# Patient Record
Sex: FEMALE | Race: ASIAN | Employment: OTHER | URBAN - METROPOLITAN AREA
[De-identification: names, ages, dates, MRNs, and addresses within clinical notes are randomized per-mention and may not be internally consistent; named-entity substitution may affect disease eponyms.]

---

## 2017-01-06 ENCOUNTER — CLINICAL SUPPORT (OUTPATIENT)
Dept: FAMILY MEDICINE CLINIC | Age: 37
End: 2017-01-06

## 2017-01-06 DIAGNOSIS — Z23 ENCOUNTER FOR IMMUNIZATION: Primary | ICD-10-CM

## 2017-01-10 ENCOUNTER — TELEPHONE (OUTPATIENT)
Dept: FAMILY MEDICINE CLINIC | Age: 37
End: 2017-01-10

## 2017-01-10 NOTE — TELEPHONE ENCOUNTER
Per fax from [employer] medication seroquel require clarification.  Fax states require prior auth for the qty

## 2017-01-24 ENCOUNTER — TELEPHONE (OUTPATIENT)
Dept: FAMILY MEDICINE CLINIC | Age: 37
End: 2017-01-24

## 2017-01-24 NOTE — TELEPHONE ENCOUNTER
Pt requesting referral to Reproductive Endocrinology and referral for breast reduction. Pt also stopped by office to drop off medical records from previous provider.

## 2017-01-24 NOTE — TELEPHONE ENCOUNTER
Spoke with patient and informed her per dr. Joseph Qiu she needs for you to come in for an appt. appt is scheduled.

## 2017-01-24 NOTE — TELEPHONE ENCOUNTER
Spoke with pt and asked pt where would she like to go for these referrals, asked pt to confirm insurance and pt stated \"aint it already in the system\", attempt to explain to pt that she will need an appt for the referrals, and to be further evaluated. Attempt made to let pt know that she has only seen Dr. Dana Cali would require an appt due to this being a new issue. Pt stated why dont Dr. Dana Cali read the notes that i dropped off. Attempt made again to let pt know that this would require an appt. Called placed on hold and nurse spoke with psr. This would require an appt. Will send to provider for final review.

## 2017-01-24 NOTE — TELEPHONE ENCOUNTER
Pt states spoke with nurse regarding referral. States was told by nurse that she need to make an appt to discuss referral with Dr Gena Sapp. Pt states does not understand why she has to make an appt. States she dropped off records for Dr Gena Sapp to review regarding referrals. [facility] told pt to contact pcp for referrals due to her lap band and hormone levels. Per nurse [facility], Referrals are very specific and will need to discuss with Dr Gena Sapp. Per nurse Sanchez Pt has been to office one time.   Pt states please have Dr Gena Sapp or referral coordinator call regarding referral.

## 2017-01-25 ENCOUNTER — OFFICE VISIT (OUTPATIENT)
Dept: FAMILY MEDICINE CLINIC | Age: 37
End: 2017-01-25

## 2017-01-25 DIAGNOSIS — Z31.69 INFERTILITY COUNSELING: Primary | ICD-10-CM

## 2017-01-25 DIAGNOSIS — E28.2 POLYCYSTIC OVARIES: ICD-10-CM

## 2017-01-29 NOTE — PROGRESS NOTES
HISTORY OF PRESENT ILLNESS  Natalee Fleix is a 31 y.o. female. HPI Comments: Patient is here today as she is due to see an ob/gyn for work up for an abnormal PAP smear. She would also like a referral to an endocrinologist/infertility specialist which I will make today. She has no other issues. Referral Follow Up   The history is provided by the patient. This is a new problem. The problem occurs constantly. The problem has been gradually worsening. Pertinent negatives include no chest pain, no abdominal pain, no headaches and no shortness of breath. Nothing aggravates the symptoms. Nothing relieves the symptoms. She has tried nothing for the symptoms. Review of Systems   Constitutional: Negative for chills, fever and malaise/fatigue. HENT: Negative for congestion, ear discharge, ear pain, hearing loss and sore throat. Eyes: Negative for blurred vision, double vision, pain and discharge. Respiratory: Negative for cough, sputum production, shortness of breath and wheezing. Cardiovascular: Negative for chest pain, palpitations, claudication and leg swelling. Gastrointestinal: Negative for abdominal pain, constipation, diarrhea, nausea and vomiting. Genitourinary: Negative for dysuria, frequency and hematuria. Musculoskeletal: Negative for back pain, joint pain and myalgias. Neurological: Negative for dizziness, tingling, focal weakness, weakness and headaches. Psychiatric/Behavioral: Negative for depression and suicidal ideas. The patient is not nervous/anxious. There were no vitals taken for this visit. Physical Exam   Constitutional: She is oriented to person, place, and time. She appears well-developed and well-nourished. No distress. HENT:   Head: Normocephalic and atraumatic. Right Ear: External ear normal.   Left Ear: External ear normal.   Mouth/Throat: Oropharynx is clear and moist.   Eyes: EOM are normal. Pupils are equal, round, and reactive to light.  No scleral icterus. Neck: Normal range of motion. No thyromegaly present. Cardiovascular: Normal rate, regular rhythm and normal heart sounds. Pulmonary/Chest: Effort normal and breath sounds normal. No respiratory distress. She has no wheezes. Abdominal: Soft. Bowel sounds are normal. She exhibits no distension. There is no tenderness. Lymphadenopathy:     She has no cervical adenopathy. Neurological: She is alert and oriented to person, place, and time. Psychiatric: She has a normal mood and affect.        ASSESSMENT and PLAN  Referral request :  - Spoke with patient and requested referral will be made

## 2017-01-30 ENCOUNTER — TELEPHONE (OUTPATIENT)
Dept: FAMILY MEDICINE CLINIC | Age: 37
End: 2017-01-30

## 2017-01-30 NOTE — TELEPHONE ENCOUNTER
Pt called twice & was placed on hold. The third time she requested to speak with the PM so I connected her to the PM's voice mail because she was not in the office yet. She called back a forth time from her work phone stating the call was being recorded. She asked for the \"Clinical Ethics Advisor\" I advised her that I was not sure what that was. She stated I could not be the only one answering the phones & I assured her I am at this time. She called back again from her cell phone and asked who doctor's De La Torre's nurse is. I informed her that her name is [facility], [address]. She then became extremely rude & calling me names so the call was disconnected.

## 2017-01-31 ENCOUNTER — TELEPHONE (OUTPATIENT)
Dept: FAMILY MEDICINE CLINIC | Age: 37
End: 2017-01-31

## 2017-01-31 NOTE — TELEPHONE ENCOUNTER
Pt requesting to speak with Elvis Steen regarding referrals. I placed pt on hold so I can finish checking in a patient. I returned to the call and advised pt papito was gone for the day and I asked pt was call regarding a referral. Pt states I placed her on hold like if I was going to get Elvis Steen and requested to speak with a nurse. I cintacted nurse Sanchez and call was transferred.

## 2017-01-31 NOTE — TELEPHONE ENCOUNTER
Pt called, pt approved Dr. Sharlene Vogt MD, Endocrinologist, [facility] refaxed information. This encounter will be closed.

## 2017-02-08 ENCOUNTER — TELEPHONE (OUTPATIENT)
Dept: FAMILY MEDICINE CLINIC | Age: 37
End: 2017-02-08

## 2017-02-08 NOTE — TELEPHONE ENCOUNTER
Patient is requesting a referral to a plastic surgeon for a breast reduction. Her OB/GYN has given her the name of Dr. Angel Elizalde [phone].   Could you please create the referral.

## 2017-02-09 NOTE — TELEPHONE ENCOUNTER
Per Endo states received referral request for pt. States unfortunately the providers do not see pt's for infertility counseling.

## 2017-02-10 NOTE — TELEPHONE ENCOUNTER
She mentioned at the last visit that she had a list of providers who will take her insurance for infertility work up.  pLEASE ASK HER INTO LOOKING INTO CALLING ONE OF THESE OFFICES

## 2017-03-16 ENCOUNTER — OFFICE VISIT (OUTPATIENT)
Dept: FAMILY MEDICINE CLINIC | Age: 37
End: 2017-03-16

## 2017-03-16 VITALS
BODY MASS INDEX: 37.93 KG/M2 | DIASTOLIC BLOOD PRESSURE: 82 MMHG | HEART RATE: 90 BPM | SYSTOLIC BLOOD PRESSURE: 114 MMHG | HEIGHT: 66 IN | RESPIRATION RATE: 18 BRPM | TEMPERATURE: 97.5 F | OXYGEN SATURATION: 97 % | WEIGHT: 236 LBS

## 2017-03-16 DIAGNOSIS — R87.610 ATYPICAL SQUAMOUS CELLS OF UNDETERMINED SIGNIFICANCE ON CYTOLOGIC SMEAR OF CERVIX (ASC-US): ICD-10-CM

## 2017-03-16 DIAGNOSIS — L02.92 BOIL: ICD-10-CM

## 2017-03-16 DIAGNOSIS — Z01.419 WELL WOMAN EXAM: ICD-10-CM

## 2017-03-16 DIAGNOSIS — Z01.419 WELL WOMAN EXAM: Primary | ICD-10-CM

## 2017-03-16 NOTE — PROGRESS NOTES
Chief Complaint   Patient presents with    Well Woman         1. Have you been to the ER, urgent care clinic since your last visit? Hospitalized since your last visit? Yes Where: [facility] for skin abscess 3/16/17    2. Have you seen or consulted any other health care providers outside of the [address] [place] [address] since your last visit? Include any pap smears or colon screening.  No

## 2017-03-16 NOTE — PROGRESS NOTES
HISTORY OF PRESENT ILLNESS  Ayush Nguyen is a 31 y.o. female. HPI Comments: Patient is here for a well woman exam.  Patient mentions she had a boil down there after she wore some pants that made is worse. Patient is due to have her PAP smear today. She did get a chance to see a fertility specialist. She mentions she will be seeing her ob/gyn in April. Well Woman   The history is provided by the patient. This is a new problem. The problem occurs constantly. The problem has not changed since onset. Pertinent negatives include no chest pain, no abdominal pain, no headaches and no shortness of breath. Nothing aggravates the symptoms. Nothing relieves the symptoms. She has tried nothing for the symptoms. Review of Systems   Constitutional: Negative for chills, fever and malaise/fatigue. HENT: Negative for congestion, ear pain, hearing loss, nosebleeds and sore throat. Eyes: Negative for blurred vision, double vision and discharge. Respiratory: Negative for cough, shortness of breath and wheezing. Cardiovascular: Negative for chest pain, palpitations and leg swelling. Gastrointestinal: Negative for abdominal pain, constipation and diarrhea. Musculoskeletal: Negative for myalgias and neck pain. Neurological: Negative for dizziness, focal weakness, weakness and headaches. Psychiatric/Behavioral: The patient is not nervous/anxious. Visit Vitals    /82    Pulse 90    Temp 97.5 °F (36.4 °C)    Resp 18    Ht 5' 6\" (1.676 m)    Wt 236 lb (107 kg)    SpO2 97%    BMI 38.09 kg/m2       Physical Exam   Constitutional: She is oriented to person, place, and time. She appears well-developed and well-nourished. No distress. HENT:   Head: Normocephalic and atraumatic. Right Ear: External ear normal.   Left Ear: External ear normal.   Mouth/Throat: Oropharynx is clear and moist.   Eyes: EOM are normal. Pupils are equal, round, and reactive to light. No scleral icterus.    Neck: Normal range of motion. No thyromegaly present. Cardiovascular: Normal rate, regular rhythm and normal heart sounds. Pulmonary/Chest: Effort normal and breath sounds normal. No respiratory distress. She has no wheezes. Abdominal: Soft. Bowel sounds are normal. She exhibits no distension. There is no tenderness. Genitourinary: Vagina normal and uterus normal. Rectal exam shows guaiac negative stool. No vaginal discharge found. Lymphadenopathy:     She has no cervical adenopathy. Neurological: She is alert and oriented to person, place, and time. Psychiatric: She has a normal mood and affect.        ASSESSMENT and PLAN  Well woman exam:  - Pap Smear done in office

## 2017-03-19 LAB
A VAGINAE DNA VAG QL NAA+PROBE: NORMAL SCORE
BVAB2 DNA VAG QL NAA+PROBE: NORMAL SCORE
C ALBICANS DNA VAG QL NAA+PROBE: NEGATIVE
C GLABRATA DNA VAG QL NAA+PROBE: NEGATIVE
C TRACH RRNA SPEC QL NAA+PROBE: NEGATIVE
MEGA1 DNA VAG QL NAA+PROBE: NORMAL SCORE
N GONORRHOEA RRNA SPEC QL NAA+PROBE: NEGATIVE
T VAGINALIS RRNA SPEC QL NAA+PROBE: NEGATIVE

## 2017-03-21 ENCOUNTER — TELEPHONE (OUTPATIENT)
Dept: FAMILY MEDICINE CLINIC | Age: 37
End: 2017-03-21

## 2017-03-21 LAB
CYTOLOGIST CVX/VAG CYTO: NORMAL
CYTOLOGY CVX/VAG DOC THIN PREP: NORMAL
DX ICD CODE: NORMAL
HPV I/H RISK 4 DNA CVX QL PROBE+SIG AMP: NEGATIVE
Lab: NORMAL
Lab: NORMAL
OTHER STN SPEC: NORMAL
PATH REPORT.FINAL DX SPEC: NORMAL
STAT OF ADQ CVX/VAG CYTO-IMP: NORMAL

## 2017-03-21 NOTE — TELEPHONE ENCOUNTER
Informed patient she needs to go back to the  ER due to her abscess, and f/u with us after. Patient verbally agreed.

## 2017-03-21 NOTE — TELEPHONE ENCOUNTER
Pt stated at the ER. Something about the system is down & wondering if someone here can do the procedure. Please call her ASAP.

## 2017-03-23 ENCOUNTER — TELEPHONE (OUTPATIENT)
Dept: FAMILY MEDICINE CLINIC | Age: 37
End: 2017-03-23

## 2017-03-23 RX ORDER — METRONIDAZOLE 500 MG/1
500 TABLET ORAL 2 TIMES DAILY
Qty: 14 TAB | Refills: 0 | Status: SHIPPED | OUTPATIENT
Start: 2017-03-23 | End: 2017-03-30

## 2017-03-26 ENCOUNTER — TELEPHONE (OUTPATIENT)
Dept: INTERNAL MEDICINE CLINIC | Age: 37
End: 2017-03-26

## 2017-03-27 ENCOUNTER — TELEPHONE (OUTPATIENT)
Dept: FAMILY MEDICINE CLINIC | Age: 37
End: 2017-03-27

## 2017-03-28 NOTE — TELEPHONE ENCOUNTER
Pt returning call again regarding pap. States called nurse cash back on mon and have not received a call back. Pt asked do she need to come to office.  Advised pt no will receive a call back

## 2017-04-04 NOTE — TELEPHONE ENCOUNTER
Pt states made an appt with GYN @ [facility] with Dr Sheryle Groom for tomorrow. States will p/u copy of pap results to take to appt.

## 2017-04-04 NOTE — TELEPHONE ENCOUNTER
Pt requesting referral to GYN in [place]. Pt inquiring if she need to follow up with GYN due to pap results or do she need to just see Dr Tash Mc. Contacted [facility], [address] and stated to have pt follow up with GYN. Advised pt of message.  Pt stated does not have a GYN

## 2017-04-12 ENCOUNTER — TELEPHONE (OUTPATIENT)
Dept: INTERNAL MEDICINE CLINIC | Age: 37
End: 2017-04-12

## 2017-04-12 RX ORDER — NAPROXEN 500 MG/1
TABLET ORAL
Qty: 20 TAB | Refills: 0 | Status: SHIPPED | OUTPATIENT
Start: 2017-04-12 | End: 2017-07-28 | Stop reason: SDUPTHER

## 2017-04-14 NOTE — TELEPHONE ENCOUNTER
Pt called on call requesting naprosyn refill from Dr. Cecilio Thakur due to a recent procedure - cool sculpting that she had done. I questioned why she wasn't asking the surgeon who performed the surgery to refill this, but it was in her med list, so I went ahead and refilled it for her.

## 2017-04-20 ENCOUNTER — OFFICE VISIT (OUTPATIENT)
Dept: FAMILY MEDICINE CLINIC | Age: 37
End: 2017-04-20

## 2017-04-20 VITALS
TEMPERATURE: 98.4 F | BODY MASS INDEX: 38.57 KG/M2 | SYSTOLIC BLOOD PRESSURE: 115 MMHG | RESPIRATION RATE: 20 BRPM | DIASTOLIC BLOOD PRESSURE: 80 MMHG | WEIGHT: 240 LBS | HEART RATE: 83 BPM | HEIGHT: 66 IN | OXYGEN SATURATION: 100 %

## 2017-04-20 DIAGNOSIS — J30.89 ENVIRONMENTAL AND SEASONAL ALLERGIES: Primary | ICD-10-CM

## 2017-04-20 DIAGNOSIS — J30.81 ALLERGIC RHINITIS DUE TO ANIMAL HAIR AND DANDER: ICD-10-CM

## 2017-04-20 RX ORDER — PRAZOSIN HYDROCHLORIDE 1 MG/1
CAPSULE ORAL
COMMUNITY
Start: 2017-02-14 | End: 2019-02-21

## 2017-04-20 RX ORDER — METFORMIN HYDROCHLORIDE 750 MG/1
TABLET, EXTENDED RELEASE ORAL
COMMUNITY
Start: 2017-03-09 | End: 2018-02-07

## 2017-04-20 RX ORDER — FLUTICASONE PROPIONATE 50 MCG
2 SPRAY, SUSPENSION (ML) NASAL
Qty: 1 BOTTLE | Refills: 1 | Status: SHIPPED | OUTPATIENT
Start: 2017-04-20 | End: 2018-04-06 | Stop reason: SDUPTHER

## 2017-04-20 RX ORDER — NEOMYCIN SULFATE, POLYMYXIN B SULFATE AND HYDROCORTISONE 10; 3.5; 1 MG/ML; MG/ML; [USP'U]/ML
3 SUSPENSION/ DROPS AURICULAR (OTIC) 4 TIMES DAILY
COMMUNITY
End: 2017-11-13 | Stop reason: SDUPTHER

## 2017-04-20 RX ORDER — ALBUTEROL SULFATE 90 UG/1
2 AEROSOL, METERED RESPIRATORY (INHALATION)
Qty: 1 INHALER | Refills: 0 | Status: SHIPPED | OUTPATIENT
Start: 2017-04-20 | End: 2018-06-25 | Stop reason: SDUPTHER

## 2017-04-20 RX ORDER — CLONAZEPAM 0.5 MG/1
TABLET ORAL
COMMUNITY
Start: 2017-03-18 | End: 2022-10-26

## 2017-04-20 RX ORDER — CLINDAMYCIN PHOSPHATE 11.9 MG/ML
SOLUTION TOPICAL
COMMUNITY
Start: 2017-03-08 | End: 2018-06-25

## 2017-04-20 RX ORDER — CETIRIZINE HCL 10 MG
10 TABLET ORAL
Qty: 20 TAB | Refills: 0 | Status: SHIPPED | OUTPATIENT
Start: 2017-04-20 | End: 2018-06-25

## 2017-04-20 RX ORDER — FLUCONAZOLE 150 MG/1
TABLET ORAL
COMMUNITY
Start: 2017-01-25 | End: 2018-04-09

## 2017-04-20 NOTE — PROGRESS NOTES
Chief Complaint   Patient presents with    Allergies    Skin Problem     1. Have you been to the ER, urgent care clinic since your last visit? Hospitalized since your last visit? No    2. Have you seen or consulted any other health care providers outside of the [address] [place] [address] since your last visit? Include any pap smears or colon screening.  No

## 2017-04-20 NOTE — PROGRESS NOTES
[facility] INTERNAL MEDICINE NOTE    Chief Complaint   Patient presents with    Allergies    Skin Problem       HPI: Marguerite Shannon is a 31 y.o. female  with PMHx significant for allergies to pets/enviroment, Bipolar d/o and anxiety who presents with above CCs    Allergies: Moved February 2017 to new apartment with pet dander and mold in carpet. Currently has sneezing in AM upon waking, chest congestion with sneezing, wheezing, shortness of breath. Also has eye discharge in AM, red itchy eyes, more ear wax and \"caking. \" No rhinorrhea. Sore throat as well. No fevers or chills. Similar symptoms with allergy aggrevation. H/o allergy shots past. Unclear if help from loratadine. No ear pain, tinnitus. Taking benadryl with help at night. ROS:  As per HPI    Past Medical Hx, Family Hx, Social Hx, Surgical Hx, Medications, Allergies: All reviewed and updated in EMR as appropriate. Current Outpatient Prescriptions   Medication Sig    tiotropium (SPIRIVA WITH HANDIHALER) 18 mcg inhalation capsule Take 1 Cap by inhalation daily.  neomycin-polymyxin-hydrocortisone, buffered, (PEDIOTIC) 3.5-10,000-1 mg/mL-unit/mL-% otic suspension Administer 3 Drops in left ear four (4) times daily.  antipyrine-benzocaine (AURALGAN) otic solution Administer 3-4 Drops in left ear every two (2) hours as needed for Pain.  clindamycin (CLEOCIN T) 1 % external solution     clonazePAM (KLONOPIN) 0.5 mg tablet     fluconazole (DIFLUCAN) 150 mg tablet     prazosin (MINIPRESS) 1 mg capsule     metFORMIN ER (GLUCOPHAGE XR) 750 mg tablet     fluticasone (FLONASE) 50 mcg/actuation nasal spray 2 Sprays by Both Nostrils route daily as needed for Rhinitis.  cetirizine (ZYRTEC) 10 mg tablet Take 1 Tab by mouth daily as needed for Allergies.  albuterol (PROVENTIL HFA, VENTOLIN HFA, PROAIR HFA) 90 mcg/actuation inhaler Take 2 Puffs by inhalation every six (6) hours as needed for Wheezing or Shortness of Breath.  inhalational spacing device 1 Each by Does Not Apply route as needed.  naproxen (NAPROSYN) 500 mg tablet 1 tab po bid po prn pain    metFORMIN (GLUCOPHAGE) 500 mg tablet Take 500 mg by mouth daily. Indications: POLYCYSTIC OVARIAN SYNDROME, Hyperglycemia    loratadine (CLARITIN) 10 mg tablet Take 1 Tab by mouth daily.  QUEtiapine SR (SEROQUEL XR) 150 mg sr tablet Take 1 Tab by mouth nightly.  OXcarbazepine (TRILEPTAL) 300 mg tablet Take 1 Tab by mouth daily.  prenatal vit-calcium-iron-fa (PREPLUS) 27 mg iron- 1 mg tab Take 1 Tab by mouth daily.  hydrOXYzine HCl (ATARAX) 50 mg tablet Take 1 Tab by mouth three (3) times daily as needed for Itching. No current facility-administered medications for this visit. OBJECTIVE:  Vitals:    04/20/17 1604   BP: 115/80   Pulse: 83   Resp: 20   Temp: 98.4 °F (36.9 °C)   TempSrc: Oral   SpO2: 100%   Weight: 240 lb (108.9 kg)   Height: 5' 6\" (1.676 m)   General: Pleasant young adult woman in no acute distress  Head: Non-traumatic, acephalic. Eyes: Pupils equally round and reactive to light, extraocular muscle intact, conjunctiva clear w/o injectoin. Ears: bilaterally normal TM, no erythema or exudate, normal light reflex. External ears with some hyperkeratotic flaking skin, non-erythematous or tender to palpation. Nares: bilaterally hyperemic with pale boggy turbinates, clear/white discharge  Mouth: Oral mucosa moist without erythema, ulceration. fair Dentition. Posterior oropharynx mild cobblestoning. Neck: Supple, no LAD, no palpable thyromegaly. Pulm: Lungs clear to auscultation bilaterally. No wheezes, rales or rhonchi. ASSESSMENT AND PLAN:    ICD-10-CM ICD-9-CM    1.  Environmental and seasonal allergies J30.89 477.8 fluticasone (FLONASE) 50 mcg/actuation nasal spray      cetirizine (ZYRTEC) 10 mg tablet  Stop taking loratidine  Given letter to apartment complex  Given return precautions      albuterol (PROVENTIL HFA, VENTOLIN HFA, PROAIR HFA) 90 mcg/actuation inhaler      inhalational spacing device   2. Allergic rhinitis due to animal hair and dander J30.81 477.2 fluticasone (FLONASE) 50 mcg/actuation nasal spray      cetirizine (ZYRTEC) 10 mg tablet      albuterol (PROVENTIL HFA, VENTOLIN HFA, PROAIR HFA) 90 mcg/actuation inhaler      inhalational spacing device     3. After visit summary provided to patient    4. Assessment and plan above discussed with patient, patient voiced understanding and agreement with plan. Ruthy Lester M.D.   [employer]  [address] [place] [address], [address] [place] [address], [address] [place], [place] [phone]  [place] [phone]

## 2017-04-20 NOTE — MR AVS SNAPSHOT
Visit Information Date & Time Provider Department Dept. Phone Encounter #  
 4/20/2017  3:30 PM Rufino Beltre MD [address] [phone] [id number] Follow-up Instructions Return if symptoms worsen or fail to improve. Upcoming Health Maintenance Date Due  
 PAP AKA CERVICAL CYTOLOGY 3/16/2020 DTaP/Tdap/Td series (2 - Td) 4/1/2025 Allergies as of 4/20/2017  Review Complete On: 4/20/2017 By: Rufino Beltre MD  
  
 Severity Noted Reaction Type Reactions Amoxicillin  12/19/2016    Hives Pork Derived (Porcine)  03/15/2017    Nausea and Vomiting Current Immunizations  Never Reviewed Name Date Influenza Vaccine (Quad) PF 1/6/2017 Not reviewed this visit You Were Diagnosed With   
  
 Codes Comments Environmental and seasonal allergies    -  Primary ICD-10-CM: J30.89 ICD-9-CM: 477.8 Allergic rhinitis due to animal hair and dander     ICD-10-CM: J30.81 ICD-9-CM: 477.2 Vitals BP Pulse Temp Resp Height(growth percentile) Weight(growth percentile) 115/80 83 98.4 °F (36.9 °C) (Oral) 20 5' 6\" (1.676 m) 240 lb (108.9 kg) LMP SpO2 BMI OB Status Smoking Status 04/20/2017 100% 38.74 kg/m2 Having regular periods Never Smoker Vitals History BMI and BSA Data Body Mass Index Body Surface Area 38.74 kg/m 2 2.25 m 2 Preferred Pharmacy Pharmacy Name Phone [id number] [phone] [facility] [phone] Your Updated Medication List  
  
   
This list is accurate as of: 4/20/17  5:05 PM.  Always use your most recent med list.  
  
  
  
  
 albuterol 90 mcg/actuation inhaler Commonly known as:  PROVENTIL HFA, VENTOLIN HFA, PROAIR HFA Take 2 Puffs by inhalation every six (6) hours as needed for Wheezing or Shortness of Breath. antipyrine-benzocaine otic solution Commonly known as:  Kathleen Burciaga  
 Administer 3-4 Drops in left ear every two (2) hours as needed for Pain. cetirizine 10 mg tablet Commonly known as:  ZYRTEC Take 1 Tab by mouth daily as needed for Allergies. clindamycin 1 % external solution Commonly known as:  CLEOCIN T  
  
 clonazePAM 0.5 mg tablet Commonly known as:  KlonoPIN  
  
 fluconazole 150 mg tablet Commonly known as:  DIFLUCAN  
  
 fluticasone 50 mcg/actuation nasal spray Commonly known as:  Heena Aid 2 Sprays by Both Nostrils route daily as needed for Rhinitis. hydrOXYzine HCl 50 mg tablet Commonly known as:  ATARAX Take 1 Tab by mouth three (3) times daily as needed for Itching. inhalational spacing device 1 Each by Does Not Apply route as needed. loratadine 10 mg tablet Commonly known as:  Vonna Hedger Take 1 Tab by mouth daily. * metFORMIN 500 mg tablet Commonly known as:  GLUCOPHAGE Take 500 mg by mouth daily. Indications: POLYCYSTIC OVARIAN SYNDROME, Hyperglycemia * metFORMIN  mg tablet Commonly known as:  GLUCOPHAGE XR  
  
 naproxen 500 mg tablet Commonly known as:  NAPROSYN  
1 tab po bid po prn pain  
  
 neomycin-polymyxin-hydrocortisone (buffered) 3.5-10,000-1 mg/mL-unit/mL-% otic suspension Commonly known as:  Sharri Radha Administer 3 Drops in left ear four (4) times daily. OXcarbazepine 300 mg tablet Commonly known as:  TRILEPTAL Take 1 Tab by mouth daily. prazosin 1 mg capsule Commonly known as:  MINIPRESS  
  
 prenatal vit-calcium-iron-fa 27 mg iron- 1 mg Tab Commonly known as:  PREPLUS Take 1 Tab by mouth daily. QUEtiapine  mg sr tablet Commonly known as:  SEROquel XR Take 1 Tab by mouth nightly. SPIRIVA WITH HANDIHALER 18 mcg inhalation capsule Generic drug:  tiotropium Take 1 Cap by inhalation daily. * Notice: This list has 2 medication(s) that are the same as other medications prescribed for you.  Read the directions carefully, and ask your doctor or other care provider to review them with you. Prescriptions Sent to Pharmacy Refills  
 fluticasone (FLONASE) 50 mcg/actuation nasal spray 1 Si Sprays by Both Nostrils route daily as needed for Rhinitis. Class: Normal  
 [id number]  [address] [facility]  Ph #: [phone] Route: Both Nostrils  
 cetirizine (ZYRTEC) 10 mg tablet 0 Sig: Take 1 Tab by mouth daily as needed for Allergies. Class: Normal  
 Pharmacy: [facility] #[id number][place] [address] [address] [facility]  Ph #: [phone] Route: Oral  
 albuterol (PROVENTIL HFA, VENTOLIN HFA, PROAIR HFA) 90 mcg/actuation inhaler 0 Sig: Take 2 Puffs by inhalation every six (6) hours as needed for Wheezing or Shortness of Breath. Class: Normal  
 Pharmacy: [facility] #[id number][place] [address] [address] [facility] Ph #: [phone] Route: Inhalation  
 inhalational spacing device 0 Si Each by Does Not Apply route as needed. Class: Normal  
 Pharmacy: [facility]acy #[id number][place] [address] [address] [facility] Ph #: [phone] Route: Does Not Apply Follow-up Instructions Return if symptoms worsen or fail to improve. Patient Instructions Allergies: 
Try to get a humidifier for your room that you sleep in at night Use cetirizine as needed for allergies Use flonase daily as needed for sneezing Albuterol as needed for shortness of breath and wheezing, use SPACER device every time you use albuterol to ensure you get medicine in you rlungs Stop picking at your ears Okay to use q tip on outside of ears, do not insert into ears Try to avoid water in ear Return if symptoms get worse or don't get better with above Introducing [facility]! Delmy Pichardo introduces [employer] patient portal. Now you can access parts of your medical record, email your doctor's office, and request medication refills online.    
 
1. In your internet browser, go to [email or web address] 2. Click on the First Time User? Click Here link in the Sign In box. You will see the New Member Sign Up page. 3. Enter your kinkon Access Code exactly as it appears below. You will not need to use this code after youve completed the sign-up process. If you do not sign up before the expiration date, you must request a new code. · kinkon Access Code: [email or web address] Expires: 6/19/2017  4:14 PM 
 
4. Enter the last four digits of your Social Security Number (xxxx) and Date of Birth (mm/dd/yyyy) as indicated and click Submit. You will be taken to the next sign-up page. 5. Create a [employer] ID. This will be your kinkon login ID and cannot be changed, so think of one that is secure and easy to remember. 6. Create a kinkon password. You can change your password at any time. 7. Enter your Password Reset Question and Answer. This can be used at a later time if you forget your password. 8. Enter your e-mail address. You will receive e-mail notification when new information is available in [address] 9. Click Sign Up. You can now view and download portions of your medical record. 10. Click the Download Summary menu link to download a portable copy of your medical information. If you have questions, please visit the Frequently Asked Questions section of the kinkon website. Remember, kinkon is NOT to be used for urgent needs. For medical emergencies, dial 911. Now available from your iPhone and Android! Please provide this summary of care documentation to your next provider. Your primary care clinician is listed as An De La Rosa. If you have any questions after today's visit, please call [phone].

## 2017-04-20 NOTE — LETTER
4/20/2017 5:03 PM 
 
Ms. Tee Glover [address] [place] [address], [phone] [id number] To Whom It May Concern, 
I am writing this letter on behalf of Ms. Tee Glover as her doctor. She has severe allergies to pet dander and pollen. Her symptoms worsened after she moved into her current dwelling. Please assist in changing the carpet of her apartment at [facility]. Thank you for your consideration. Sincerely, Miladys Kim MD

## 2017-04-20 NOTE — PATIENT INSTRUCTIONS
Allergies:  Try to get a humidifier for your room that you sleep in at night  Use cetirizine as needed for allergies  Use flonase daily as needed for sneezing  Albuterol as needed for shortness of breath and wheezing, use SPACER device every time you use albuterol to ensure you get medicine in you rlungs  Stop picking at your ears  Okay to use q tip on outside of ears, do not insert into ears  Try to avoid water in ear  Return if symptoms get worse or don't get better with above

## 2017-04-21 ENCOUNTER — TELEPHONE (OUTPATIENT)
Dept: FAMILY MEDICINE CLINIC | Age: 37
End: 2017-04-21

## 2017-04-21 NOTE — TELEPHONE ENCOUNTER
Per fax from [employer] Prior auth requested for medication  Cetrizine HCL 10 mg Tablet. Per message from [employer] drug not on formulary.

## 2017-04-24 NOTE — TELEPHONE ENCOUNTER
Fax received requesting Prior auth for medication Cetrizine 10 mg.  Per fax medication not on formulary

## 2017-04-27 ENCOUNTER — TELEPHONE (OUTPATIENT)
Dept: FAMILY MEDICINE CLINIC | Age: 37
End: 2017-04-27

## 2017-04-27 NOTE — TELEPHONE ENCOUNTER
Pt states received a bill for 215.00 from office. States has called billing dept and was told diagnosis code has to be recoded in order for insurance to p/u office visit. Pt states she did not see Dr Jayla Cartagena for infertility counseling. States she was referred to a specialist for that.

## 2017-05-01 NOTE — TELEPHONE ENCOUNTER
Pt following up on Dx code change for DOS 01/25/17. Per Alisha Wen will be faxing over notes for dx code for PCOS. States please use same dx code. Pt's insurance does not accept dx code for infertility counseling. Notes from [facility] received.

## 2017-05-02 ENCOUNTER — TELEPHONE (OUTPATIENT)
Dept: FAMILY MEDICINE CLINIC | Age: 37
End: 2017-05-02

## 2017-05-02 DIAGNOSIS — J30.81 ALLERGIC RHINITIS DUE TO ANIMAL HAIR AND DANDER: Primary | ICD-10-CM

## 2017-05-02 DIAGNOSIS — J30.89 ENVIRONMENTAL AND SEASONAL ALLERGIES: ICD-10-CM

## 2017-05-02 RX ORDER — FEXOFENADINE HCL 60 MG
60 TABLET ORAL
Qty: 30 TAB | Refills: 1 | Status: SHIPPED | OUTPATIENT
Start: 2017-05-02 | End: 2017-07-01

## 2017-05-02 NOTE — TELEPHONE ENCOUNTER
Prior authorization denied on account of no documented trials of loratadine and fexofenadine. Per chart note 4/20/2017 pt with loratadine trial but not fexofenadine. Will order fexofenadine to evaluation. Cetrizine hold for now. If fails allegra (fexofenadine) will re-trial insurance authorization for cetrizine. All questions answered. Avi Kim M.D.   [address], [address] [place] [address] [phone]  [facility] [phone]

## 2017-05-03 ENCOUNTER — TELEPHONE (OUTPATIENT)
Dept: FAMILY MEDICINE CLINIC | Age: 37
End: 2017-05-03
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
## 2017-05-03 NOTE — TELEPHONE ENCOUNTER
Pt requesting to speak with practice manager Pasquale Junior regarding a medical form. Pt would also like to be updated on dx code change. States [employer] used dx code for PCOS and it was covered.

## 2017-05-03 NOTE — TELEPHONE ENCOUNTER
Pt requesting to speak to Dr Preet Lynch regarding dx code for [facility] 01/25/17. States was not seen for infertility. Pt states spoke with Huma Cuadra regarding issue and was told to follow up with Dr Preet Lynch. States please call ASAP.

## 2017-05-03 NOTE — TELEPHONE ENCOUNTER
Per Do Lerma, States spoke with pt regarding billing issue. Hannah Prater states if pt has anymore issues to please have pt call her. Brandy states all info has been forwarded to Dr Reinaldo Eubanks.

## 2017-05-03 NOTE — TELEPHONE ENCOUNTER
Called Helga Shell with [address] [place] [address]  regarding \"MEDICAL VERIFICATION LETTER\" form. KRYSTYNA signed by Tien Woodward in chart to allow clinicians to give health information to above. Informed Helga Shell that patients diagnosis \"Allergies\" does not fit criteria for \"disability\" and thus writer/MD unable to fill out form. Letter to Ms. Nidia Lemons [facility] admin was to request carpet replaced given worsening of allergies associated with current carpet in living dwelling. Will fax this note to Helga Shell. Called Daria Mulligan to inform of the above. Connie Major M.D.   [address], [address] [place] [address], [address] [place] [address] [phone]  [place] [phone]

## 2017-05-03 NOTE — TELEPHONE ENCOUNTER
Pt requested appt for today w/ Dr Paulette Yates to discuss billing issue. I advised her that Dr Paulette Yates does have an available appt today but does tomorrow. Pt became insistent that Dr. Paulette Yates return her call today. & disconnected the call.

## 2017-05-03 NOTE — TELEPHONE ENCOUNTER
Pt states please inform Dr Patricia Matthew form to housing authority does not need to be filled out. Pt states she called the manager of her residence and stated form will not be reviewed by a medical clinician and not needed. States all that is needed is the letter with   and SSN written by Dr Patricia Matthew. Advised pt will send message to provider.

## 2017-05-04 ENCOUNTER — TELEPHONE (OUTPATIENT)
Dept: FAMILY MEDICINE CLINIC | Age: 37
End: 2017-05-04

## 2017-05-04 NOTE — TELEPHONE ENCOUNTER
Pt states calling say thank you to Dr Bernard Monroy. States carpet will be changed on tues 05/09/17. States she did understand what Dr Bernard Monroy  was trying to say yesterday and she is very appreciative.

## 2017-05-22 ENCOUNTER — OFFICE VISIT (OUTPATIENT)
Dept: FAMILY MEDICINE CLINIC | Age: 37
End: 2017-05-22

## 2017-05-22 VITALS
SYSTOLIC BLOOD PRESSURE: 116 MMHG | HEIGHT: 66 IN | WEIGHT: 239 LBS | TEMPERATURE: 97 F | BODY MASS INDEX: 38.41 KG/M2 | OXYGEN SATURATION: 100 % | HEART RATE: 73 BPM | RESPIRATION RATE: 18 BRPM | DIASTOLIC BLOOD PRESSURE: 68 MMHG

## 2017-05-22 DIAGNOSIS — M54.50 ACUTE MIDLINE LOW BACK PAIN WITHOUT SCIATICA: Primary | ICD-10-CM

## 2017-05-22 RX ORDER — TRETINOIN 0.25 MG/G
CREAM TOPICAL
COMMUNITY
Start: 2017-04-25 | End: 2018-06-25

## 2017-05-22 RX ORDER — INHALER,ASSIST DEVICE,ACCESORY
EACH MISCELLANEOUS
Refills: 0 | COMMUNITY
Start: 2017-04-20 | End: 2018-06-25
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
## 2017-05-24 ENCOUNTER — TELEPHONE (OUTPATIENT)
Dept: FAMILY MEDICINE CLINIC | Age: 37
End: 2017-05-24

## 2017-05-24 NOTE — TELEPHONE ENCOUNTER
Pt following up on xray results. [facility] has an appt with [facility] in [place] tomorrow 05/25/17. [facility] Rebecca with [facility] was supposed to call the office to request recent xray of the spine. Pt provided phone # for Aydin Chamorro [phone] option 1 ext [phone].

## 2017-05-24 NOTE — TELEPHONE ENCOUNTER
Pt stopped by the office to p/u xray result letter. States will be back tomorrow to p/u CD.  Stated appt is 1:00pm.

## 2017-06-29 ENCOUNTER — TELEPHONE (OUTPATIENT)
Dept: FAMILY MEDICINE CLINIC | Age: 37
End: 2017-06-29

## 2017-06-29 NOTE — TELEPHONE ENCOUNTER
Pt requesting records received from previous PCP Dr Serena Brittle in [place] to ne sent to Dr David Vang plastic surgeon. Pt states has info in records concerning her breast measurement.  Advised pt must sign a release form before we are able to fax over records to Dr Lauren Young. Pt stated ok will stop by the office to sign a release form

## 2017-07-28 ENCOUNTER — TELEPHONE (OUTPATIENT)
Dept: FAMILY MEDICINE CLINIC | Age: 37
End: 2017-07-28

## 2017-07-28 DIAGNOSIS — M54.6 CHRONIC BILATERAL THORACIC BACK PAIN: Primary | ICD-10-CM

## 2017-07-28 DIAGNOSIS — G89.29 CHRONIC BILATERAL THORACIC BACK PAIN: Primary | ICD-10-CM

## 2017-07-28 RX ORDER — NAPROXEN 500 MG/1
TABLET ORAL
Qty: 20 TAB | Refills: 0 | Status: SHIPPED | OUTPATIENT
Start: 2017-07-28 | End: 2017-12-29 | Stop reason: SDUPTHER

## 2017-07-31 ENCOUNTER — TELEPHONE (OUTPATIENT)
Dept: INTERNAL MEDICINE CLINIC | Age: 37
End: 2017-07-31

## 2017-07-31 ENCOUNTER — TELEPHONE (OUTPATIENT)
Dept: FAMILY MEDICINE CLINIC | Age: 37
End: 2017-07-31

## 2017-07-31 NOTE — TELEPHONE ENCOUNTER
Called patient and spoke to her. Spoke to her about what was recommended from surgeon and she understood. She will try her best to loose the weight and then follow up with Dr. Pablo Gastelum to see if reduction is surgically necessary . I have explained to her that I also feel that at this time a breast reduction is not needed and she should try weight loss. I have also explained to her that although her specialists have send notes in regards to her breast  Concern she has not made an an official appointment to discuss this and she is aware of that. She was told by insurance to initate this process that she can find her own plastic surgeon. She was also under the wrong impression that her insurance was ready to clear the procedure and that because Dr Pablo Gastelum did  not agree that  she could not have the procedure done and because I would not support this due to not knowing about her breast concerns. Currently she is seeing an endocrinologist who has her on metformin for PCOS. She does mention she is going for physical therapy and was concerned as they mentioned her pain could be attributed to her breast mass. I have explained to her that as she looses weight it is also possible she will  loose some breast mass and she understands. She will follow up with us if there are any other concerns. I have also advised her that after appropriate weight loss that she should make an appointment with me and her surgeon  to discuss breast reduction and she verbalizes an understanding.
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
## 2017-07-31 NOTE — TELEPHONE ENCOUNTER
Pt returned call from Dr. Ramila Redmond. She can be reched at [phone].  ------------------------------------------------------------------------    Nimesh Otero MD  07/31/17 7637  Signed  Have attempted to call and reach out to patient in regards to her concerns and left a voice mail.   If she calls back please make me aware , if I can I will speak to her otherwise please ask for a good phone number to call her back on     Closed by Nimesh Otero MD on 7/31/17 at 8:15 AM

## 2017-09-22 RX ORDER — NORGESTIMATE AND ETHINYL ESTRADIOL 7DAYSX3 28
KIT ORAL
Refills: 12 | COMMUNITY
Start: 2017-06-30 | End: 2018-06-25

## 2017-09-22 RX ORDER — NYSTATIN AND TRIAMCINOLONE ACETONIDE 100000; 1 [USP'U]/G; MG/G
OINTMENT TOPICAL 2 TIMES DAILY
Qty: 30 G | Refills: 0 | Status: SHIPPED | OUTPATIENT
Start: 2017-09-22 | End: 2018-06-25

## 2017-09-22 RX ORDER — CLOTRIMAZOLE AND BETAMETHASONE DIPROPIONATE 10; .64 MG/G; MG/G
CREAM TOPICAL 2 TIMES DAILY
Qty: 15 G | Refills: 0 | Status: SHIPPED | OUTPATIENT
Start: 2017-09-22 | End: 2018-06-25

## 2017-09-22 RX ORDER — HYDROCORTISONE 25 MG/G
CREAM TOPICAL 2 TIMES DAILY
Qty: 30 G | Refills: 0 | Status: SHIPPED | OUTPATIENT
Start: 2017-09-22 | End: 2018-06-25

## 2017-09-29 ENCOUNTER — TELEPHONE (OUTPATIENT)
Dept: FAMILY MEDICINE CLINIC | Age: 37
End: 2017-09-29

## 2017-11-13 ENCOUNTER — OFFICE VISIT (OUTPATIENT)
Dept: FAMILY MEDICINE CLINIC | Age: 37
End: 2017-11-13

## 2017-11-13 VITALS
DIASTOLIC BLOOD PRESSURE: 69 MMHG | HEIGHT: 66 IN | OXYGEN SATURATION: 100 % | HEART RATE: 87 BPM | WEIGHT: 239 LBS | TEMPERATURE: 98.7 F | BODY MASS INDEX: 38.41 KG/M2 | SYSTOLIC BLOOD PRESSURE: 120 MMHG | RESPIRATION RATE: 16 BRPM

## 2017-11-13 DIAGNOSIS — H92.01 EAR PAIN, REFERRED, RIGHT: Primary | ICD-10-CM

## 2017-11-13 RX ORDER — NEOMYCIN SULFATE, POLYMYXIN B SULFATE AND HYDROCORTISONE 10; 3.5; 1 MG/ML; MG/ML; [USP'U]/ML
3 SUSPENSION/ DROPS AURICULAR (OTIC) 4 TIMES DAILY
Qty: 5 ML | Refills: 0 | Status: SHIPPED | OUTPATIENT
Start: 2017-11-13 | End: 2018-06-25

## 2017-11-13 NOTE — PROGRESS NOTES
HISTORY OF PRESENT ILLNESS  Sumit Peralta is a 30 y.o. female. Medication Refill   Pertinent negatives include no chest pain, no abdominal pain, no headaches and no shortness of breath. Skin Problem   The history is provided by the patient. This is a new problem. The problem occurs constantly. The problem has not changed since onset. Pertinent negatives include no chest pain, no abdominal pain, no headaches and no shortness of breath. Nothing aggravates the symptoms. Nothing relieves the symptoms. Review of Systems   Constitutional: Negative for fever. HENT: Positive for ear pain. Negative for congestion, hearing loss, nosebleeds, sinus pain, sore throat and tinnitus. Eyes: Negative for blurred vision, double vision and discharge. Respiratory: Negative for cough, sputum production and shortness of breath. Cardiovascular: Negative for chest pain, palpitations and leg swelling. Gastrointestinal: Negative for abdominal pain, nausea and vomiting. Genitourinary: Negative for dysuria, frequency, hematuria and urgency. Musculoskeletal: Negative for back pain and joint pain. Neurological: Negative for headaches. Psychiatric/Behavioral: Negative for depression. The patient is nervous/anxious and has insomnia. Visit Vitals    /69    Pulse 87    Temp 98.7 °F (37.1 °C) (Oral)    Resp 16    Ht 5' 6\" (1.676 m)    Wt 239 lb (108.4 kg)    SpO2 100%    BMI 38.58 kg/m2       Physical Exam   Constitutional: She is oriented to person, place, and time. She appears well-developed and well-nourished. No distress. HENT:   Head: Normocephalic and atraumatic. Right Ear: External ear normal. Right ear exhibits lacerations. Tympanic membrane is injected. Left Ear: External ear normal.   Ears:    Mouth/Throat: Oropharynx is clear and moist. No oropharyngeal exudate. Eyes: EOM are normal. Pupils are equal, round, and reactive to light. No scleral icterus. Neck: Normal range of motion.  No thyromegaly present. Cardiovascular: Normal rate, regular rhythm and normal heart sounds. Pulmonary/Chest: Effort normal and breath sounds normal. No respiratory distress. She has no wheezes. Abdominal: Soft. Bowel sounds are normal. She exhibits no distension. There is no tenderness. Lymphadenopathy:     She has no cervical adenopathy. Neurological: She is alert and oriented to person, place, and time. Psychiatric: She has a normal mood and affect. ASSESSMENT and PLAN  Right ear pain/ OM:  Ok to use ibuprofen or tylenol to control pain. - Please finish antibiotic ear drops as prescribed, this is a combination of antibiotic and steroid. - Please do not allow water to get into your ears.   - Wear a shower cap while washing hair.  - Please make an appointment to see ear specialist.

## 2017-11-13 NOTE — PROGRESS NOTES
Patient is here for medication refill for ear dryness. Patient would like to have her cholesterol checked again. 1. Have you been to the ER, urgent care clinic since your last visit? Hospitalized since your last visit?no    2. Have you seen or consulted any other health care providers outside of the [address] [place] [address] since your last visit? Include any pap smears or colon screening.  no

## 2017-12-27 ENCOUNTER — TELEPHONE (OUTPATIENT)
Dept: FAMILY MEDICINE CLINIC | Age: 37
End: 2017-12-27

## 2017-12-27 DIAGNOSIS — J32.9 CHRONIC SINUSITIS, UNSPECIFIED LOCATION: Primary | ICD-10-CM

## 2017-12-27 NOTE — TELEPHONE ENCOUNTER
Patient is here requesting a referral to ent for her sinuses. Patient stated wanted to make sure ent is in [place] and will accept her insurance.

## 2017-12-29 DIAGNOSIS — G89.29 CHRONIC BILATERAL THORACIC BACK PAIN: ICD-10-CM

## 2017-12-29 DIAGNOSIS — M54.6 CHRONIC BILATERAL THORACIC BACK PAIN: ICD-10-CM

## 2018-01-01 RX ORDER — NAPROXEN 500 MG/1
TABLET ORAL
Qty: 20 TAB | Refills: 0 | Status: SHIPPED | OUTPATIENT
Start: 2018-01-01 | End: 2018-02-07

## 2018-01-10 ENCOUNTER — TELEPHONE (OUTPATIENT)
Dept: FAMILY MEDICINE CLINIC | Age: 38
End: 2018-01-10

## 2018-01-10 NOTE — TELEPHONE ENCOUNTER
Pt states has an appt with      Dr Lucille Sanchez nose and throat  [address] [facility], [address] [facility], [address] [place] [address]  Phone: [phone]  Fax: [phone]     Pt states original appt was cancelled due to snow. States appt was before the snow hit but appt was still cancelled. Pt states now there are no available appts. Pt states called Dr Morgan Wilson office and was able to schedule an appt for today @ 3:30pm. Pt states will stop by the office to p/u referral info.

## 2018-01-10 NOTE — TELEPHONE ENCOUNTER
states received a new insurance card and stated all pharmacy inquires for physicians and nurse has to call [phone].

## 2018-01-12 ENCOUNTER — TELEPHONE (OUTPATIENT)
Dept: FAMILY MEDICINE CLINIC | Age: 38
End: 2018-01-12

## 2018-01-12 NOTE — TELEPHONE ENCOUNTER
Pt states went to [facility] first for a sebacious cysyt on her lip. States was told to go to a dermatologist or pcp to remove or drain the cyst. States able to be seen by dermatologist @ 1:30 pm today. Advised pt to keep appt and follow up with Dr Luis Santoro. Pt stated ok.

## 2018-02-07 ENCOUNTER — OFFICE VISIT (OUTPATIENT)
Dept: FAMILY MEDICINE CLINIC | Age: 38
End: 2018-02-07

## 2018-02-07 VITALS
SYSTOLIC BLOOD PRESSURE: 108 MMHG | DIASTOLIC BLOOD PRESSURE: 75 MMHG | HEART RATE: 72 BPM | RESPIRATION RATE: 18 BRPM | OXYGEN SATURATION: 99 % | HEIGHT: 67 IN | TEMPERATURE: 98.7 F

## 2018-02-07 DIAGNOSIS — N92.1 MENORRHAGIA WITH IRREGULAR CYCLE: Primary | ICD-10-CM

## 2018-02-07 DIAGNOSIS — E28.2 PCOS (POLYCYSTIC OVARIAN SYNDROME): ICD-10-CM

## 2018-02-07 RX ORDER — IBUPROFEN 800 MG/1
800 TABLET ORAL
Qty: 90 TAB | Refills: 0 | Status: SHIPPED | OUTPATIENT
Start: 2018-02-07 | End: 2018-06-25 | Stop reason: SDUPTHER

## 2018-02-07 NOTE — MR AVS SNAPSHOT
[facility] [address] [place], [place] [phone] Patient: Cami Wen MRN: [id number] YHZ:0[birth year] Visit Information Date & Time Provider Department Dept. Phone Encounter #  
 2/7/2018  2:30 PM Estella Pedroza MD [facility] [phone] [id number] Follow-up Instructions Return in about 1 month (around 3/7/2018), or if symptoms worsen or fail to improve, for AUB. Upcoming Health Maintenance Date Due Influenza Age 5 to Adult 8/1/2017 PAP AKA CERVICAL CYTOLOGY 3/16/2020 DTaP/Tdap/Td series (2 - Td) 4/1/2025 Allergies as of 2/7/2018  Review Complete On: 2/7/2018 By: Catrina Carpenter Severity Noted Reaction Type Reactions Amoxicillin  12/19/2016    Hives Pork Derived (Porcine)  03/15/2017    Nausea and Vomiting Current Immunizations  Never Reviewed Name Date Influenza Vaccine (Quad) PF 1/6/2017 Not reviewed this visit You Were Diagnosed With   
  
 Codes Comments Menorrhagia with irregular cycle    -  Primary ICD-10-CM: N92.1 ICD-9-CM: 626.2 Vitals BP Pulse Temp Resp Height(growth percentile) SpO2  
 108/75 (BP 1 Location: Left arm, BP Patient Position: Sitting) 72 98.7 °F (37.1 °C) (Oral) 18 5' 6.5\" (1.689 m) 99% OB Status Smoking Status Ablation Never Smoker Preferred Pharmacy Pharmacy Name Phone [facility] [address] [place] [address] [phone] Your Updated Medication List  
  
   
This list is accurate as of: 2/7/18  3:00 PM.  Always use your most recent med list.  
  
  
  
  
 albuterol 90 mcg/actuation inhaler Commonly known as:  PROVENTIL HFA, VENTOLIN HFA, PROAIR HFA Take 2 Puffs by inhalation every six (6) hours as needed for Wheezing or Shortness of Breath. antipyrine-benzocaine otic solution Commonly known as:  [place] Administer 3-4 Drops in left ear every two (2) hours as needed for Pain. cetirizine 10 mg tablet Commonly known as:  ZYRTEC Take 1 Tab by mouth daily as needed for Allergies. clindamycin 1 % external solution Commonly known as:  CLEOCIN T  
  
 clonazePAM 0.5 mg tablet Commonly known as:  KlonoPIN  
  
 clotrimazole-betamethasone topical cream  
Commonly known as:  Jade Razoon Apply  to affected area two (2) times a day. fluconazole 150 mg tablet Commonly known as:  DIFLUCAN  
  
 fluticasone 50 mcg/actuation nasal spray Commonly known as:  Naebel Lover 2 Sprays by Both Nostrils route daily as needed for Rhinitis. hydrocortisone 2.5 % topical cream  
Commonly known as:  HYTONE Apply  to affected area two (2) times a day. use thin layer  
  
 hydrOXYzine HCl 50 mg tablet Commonly known as:  ATARAX Take 1 Tab by mouth three (3) times daily as needed for Itching. ibuprofen 800 mg tablet Commonly known as:  MOTRIN Take 1 Tab by mouth every eight (8) hours as needed for Pain.  
  
 inhalational spacing device 1 Each by Does Not Apply route as needed. loratadine 10 mg tablet Commonly known as:  Marcelo Friday Take 1 Tab by mouth daily. metFORMIN 500 mg tablet Commonly known as:  GLUCOPHAGE Take 500 mg by mouth daily. Indications: POLYCYSTIC OVARIAN SYNDROME, Hyperglycemia  
  
 neomycin-polymyxin-hydrocortisone (buffered) 3.5-10,000-1 mg/mL-unit/mL-% otic suspension Commonly known as:  Bogdan Alvares Administer 3 Drops in left ear four (4) times daily. nystatin-triamcinolone 100,000-0.1 unit/gram-% ointment Commonly known as:  Sandor Gunning Apply  to affected area two (2) times a day. [address] [place] [address] Generic drug:  [place] Se 1 EACH BY DOES NOT APPLY ROUTE AS NEEDED. ORTHO TRI-CYCLEN (28) 0.18/0.215/0.25 mg-35 mcg (28) Tab Generic drug:  norgestimate-ethinyl estradiol TAKE 1 TABLET BY MOUTH DAILY OXcarbazepine 300 mg tablet Commonly known as:  TRILEPTAL Take 1 Tab by mouth daily. prazosin 1 mg capsule Commonly known as:  MINIPRESS  
  
 prenatal vit-calcium-iron-fa 27 mg iron- 1 mg Tab Commonly known as:  PREPLUS Take 1 Tab by mouth daily. QUEtiapine  mg sr tablet Commonly known as:  SEROquel XR Take 1 Tab by mouth nightly. SPIRIVA WITH HANDIHALER 18 mcg inhalation capsule Generic drug:  tiotropium Take 1 Cap by inhalation daily. tretinoin 0.025 % topical cream  
Commonly known as:  RETIN-A Prescriptions Sent to Pharmacy Refills  
 ibuprofen (MOTRIN) 800 mg tablet 0 Sig: Take 1 Tab by mouth every eight (8) hours as needed for Pain. Class: Normal  
 Pharmacy: [facility] [address] [place] [address] [facility]  #: [phone] Route: Oral  
  
We Performed the Following REFERRAL TO GYNECOLOGY [REF30 Custom] Follow-up Instructions Return in about 1 month (around 3/7/2018), or if symptoms worsen or fail to improve, for AUB. Referral Information Referral ID Referred By Referred To  
  
 [id number] Radha Olvera N Not Available Visits Status Start Date End Date 1 New Request 2/7/18 2/7/19 If your referral has a status of pending review or denied, additional information will be sent to support the outcome of this decision. Patient Instructions Abnormal Uterine Bleeding: Care Instructions Your Care Instructions Abnormal uterine bleeding (AUB) is irregular bleeding from the uterus that is longer or heavier than usual or does not occur at your regular time. Sometimes it is caused by changes in hormone levels. It can also be caused by growths in the uterus, such as fibroids or polyps. Sometimes a cause cannot be found. You may have heavy bleeding when you are not expecting your period.  Your doctor may suggest a pregnancy test, if you think you are pregnant. Follow-up care is a key part of your treatment and safety. Be sure to make and go to all appointments, and call your doctor if you are having problems. It's also a good idea to know your test results and keep a list of the medicines you take. How can you care for yourself at home? · Be safe with medicines. Take pain medicines exactly as directed. ¨ If the doctor gave you a prescription medicine for pain, take it as prescribed. ¨ If you are not taking a prescription pain medicine, ask your doctor if you can take an over-the-counter medicine. · You may be low in iron because of blood loss. Eat a balanced diet that is high in iron and vitamin C. Foods rich in iron include red meat, shellfish, eggs, beans, and leafy green vegetables. Talk to your doctor about whether you need to take iron pills or a multivitamin. When should you call for help? Call 911 anytime you think you may need emergency care. For example, call if: 
? · You passed out (lost consciousness). ?Call your doctor now or seek immediate medical care if: 
? · You have new or worse belly or pelvic pain. ? · You have severe vaginal bleeding. ? · You feel dizzy or lightheaded, or you feel like you may faint. ? Watch closely for changes in your health, and be sure to contact your doctor if: 
? · You think you may be pregnant. ? · Your bleeding gets worse. ? · You do not get better as expected. Where can you learn more? Go to http://nenita-anjali.info/. Enter D655 in the search box to learn more about \"Abnormal Uterine Bleeding: Care Instructions. \" Current as of: October 13, 2016 Content Version: 11.4 © 9443-4277 [employer]. Care instructions adapted under license by [employer] (which disclaims liability or warranty for this information).  If you have questions about a medical condition or this instruction, always ask your healthcare professional. [address] any warranty or liability for your use of this information. Introducing [facility]! Dear Brian Do: Thank you for requesting a [employer] account. Our records indicate that you already have an active [employer] account. You can access your account anytime at [email or web address] Did you know that you can access your hospital and ER discharge instructions at any time in [employer]? You can also review all of your test results from your hospital stay or ER visit. Additional Information If you have questions, please visit the Frequently Asked Questions section of the [employer] website at [email or web address] Remember, [employer] is NOT to be used for urgent needs. For medical emergencies, dial 911. Now available from your iPhone and Android! Please provide this summary of care documentation to your next provider. Your primary care clinician is listed as Estella Lu. If you have any questions after today's visit, please call [phone].

## 2018-02-07 NOTE — PROGRESS NOTES
Chief Complaint   Patient presents with   [facility] Establish Care    Irregular Menses     1. Have you been to the ER, urgent care clinic since your last visit? Hospitalized since your last visit? Yes, 1/31/2018 at [facility] irregular cycle. 2. Have you seen or consulted any other health care providers outside of the [address] [place] [address] since your last visit? Include any pap smears or colon screening. Yes, with Dr. Mae Wong (OB/GYN) on 1/26/218 for irregular cycle. TIGRE  Skip Soulier comes in to establish care. Irregular menses: Patient has had irregular menses for years. He is seen by gynecologist and has also been seen by an endocrinologist for [address]  Her. Now has lasted for a month. This is concerning for her. She was seen by the gynecologist and was prescribed medroxyprogesterone 10 mg p.o. for 10 days. She has used this in the past at different times. Feels that this medication has not really helped her. She has recently moved into the area and the would like to establish with a gynecologist.  I will place referral for gynecology evaluation. With the irregular and heavy menses she does get dysmenorrhea. Uses ibuprofen and thus helps with the pain. I will send in a prescription for that. Patient does have an appointment to see her gynecologist this Friday. May get  of pelvic ultrasound done. Will follow recommendations by the specialist.  I did reassure her that the everything was being done to ensure the reason as to why she is having this is found. At times will look while we are trying to manage symptoms. Patient denies dizziness or syncope. She was concerned about her hemoglobin and wonders whether she is anemic. She recently had her hemoglobin checked and it was normal and she was reassured of this. PCO S: Patient has been followed up by an endocrinologist and is on metformin. She is stable on this medication.       Past Medical History  Past Medical History:   Diagnosis Date    Hypoglycemia     Ill-defined condition     GYN, polycystic ovarian disease.  PCOS (polycystic ovarian syndrome)     Psychiatric disorder     Anxiety       Surgical History  No past surgical history on file. Medications  Current Outpatient Prescriptions   Medication Sig Dispense Refill    ibuprofen (MOTRIN) 800 mg tablet Take 1 Tab by mouth every eight (8) hours as needed for Pain. 90 Tab 0    neomycin-polymyxin-hydrocortisone, buffered, (PEDIOTIC) 3.5-10,000-1 mg/mL-unit/mL-% otic suspension Administer 3 Drops in left ear four (4) times daily. 5 mL 0    prenatal vit-calcium-iron-fa (PREPLUS) 27 mg iron- 1 mg tab Take 1 Tab by mouth daily. 90 Tab 0    hydrocortisone (HYTONE) 2.5 % topical cream Apply  to affected area two (2) times a day. use thin layer 30 g 0    nystatin-triamcinolone (MYCOLOG) 100,000-0.1 unit/gram-% ointment Apply  to affected area two (2) times a day. 30 g 0    clotrimazole-betamethasone (LOTRISONE) topical cream Apply  to affected area two (2) times a day. 15 g 0    tretinoin (RETIN-A) 0.025 % topical cream       tiotropium (SPIRIVA WITH HANDIHALER) 18 mcg inhalation capsule Take 1 Cap by inhalation daily.  antipyrine-benzocaine (AURALGAN) otic solution Administer 3-4 Drops in left ear every two (2) hours as needed for Pain.  clindamycin (CLEOCIN T) 1 % external solution       clonazePAM (KLONOPIN) 0.5 mg tablet       fluconazole (DIFLUCAN) 150 mg tablet       prazosin (MINIPRESS) 1 mg capsule       fluticasone (FLONASE) 50 mcg/actuation nasal spray 2 Sprays by Both Nostrils route daily as needed for Rhinitis. 1 Bottle 1    cetirizine (ZYRTEC) 10 mg tablet Take 1 Tab by mouth daily as needed for Allergies. 20 Tab 0    albuterol (PROVENTIL HFA, VENTOLIN HFA, PROAIR HFA) 90 mcg/actuation inhaler Take 2 Puffs by inhalation every six (6) hours as needed for Wheezing or Shortness of Breath.  1 Inhaler 0    inhalational spacing device 1 Each by Does Not Apply route as needed. 1 Each 0    metFORMIN (GLUCOPHAGE) 500 mg tablet Take 500 mg by mouth daily. Indications: POLYCYSTIC OVARIAN SYNDROME, Hyperglycemia      loratadine (CLARITIN) 10 mg tablet Take 1 Tab by mouth daily. 90 Tab 0    QUEtiapine SR (SEROQUEL XR) 150 mg sr tablet Take 1 Tab by mouth nightly. 90 Tab 0    OXcarbazepine (TRILEPTAL) 300 mg tablet Take 1 Tab by mouth daily. 90 Tab 0    ORTHO TRI-CYCLEN, 28, 0.18/0.215/0.25 mg-35 mcg (28) tab TAKE 1 TABLET BY MOUTH DAILY  12    OPTICHAMBER ADULT MASK-LARGE sarah 1 EACH BY DOES NOT APPLY ROUTE AS NEEDED.  0    hydrOXYzine HCl (ATARAX) 50 mg tablet Take 1 Tab by mouth three (3) times daily as needed for Itching. 90 Tab 3       Allergies  Allergies   Allergen Reactions    Amoxicillin Hives    Pork Derived (Porcine) Nausea and Vomiting       Family History  No family history on file. Social History  Social History     Social History    Marital status: SINGLE     Spouse name: N/A    Number of children: N/A    Years of education: N/A     Occupational History    Not on file. Social History Main Topics    Smoking status: Never Smoker    Smokeless tobacco: Never Used    Alcohol use Yes      Comment: Patient sates that she drinks wine.  Drug use: No    Sexual activity: Yes     Partners: Male      Comment: Male Partner had vasectomy.      Other Topics Concern    Not on file     Social History Narrative       Review of Systems  Review of Systems - History obtained from the patient  General ROS: positive for  - fatigue and malaise  Psychological ROS: positive for - behavioral disorder  Ophthalmic ROS: negative  ENT ROS: negative  Allergy and Immunology ROS: negative  Hematological and Lymphatic ROS: negative  Endocrine ROS: pcos  Respiratory ROS: no cough, shortness of breath, or wheezing  Cardiovascular ROS: no chest pain or dyspnea on exertion  Gastrointestinal ROS: no abdominal pain, change in bowel habits, or black or bloody stools  Genito-Urinary ROS: positive for - irregular/heavy menses and pelvic pain  Musculoskeletal ROS: negative  Neurological ROS: negative    Vital Signs  Visit Vitals    /75 (BP 1 Location: Left arm, BP Patient Position: Sitting)    Pulse 72    Temp 98.7 °F (37.1 °C) (Oral)    Resp 18    Ht 5' 6.5\" (1.689 m)    SpO2 99%         Physical Exam  Physical Examination: General appearance - alert, well appearing, and in no distress, oriented to person, place, and time and acyanotic, in no respiratory distress  Mental status - alert, oriented to person, place, and time, normal mood, behavior, speech, dress, motor activity, and thought processes  Neck - supple, no significant adenopathy  Lymphatics - no palpable lymphadenopathy  Chest - clear to auscultation, no wheezes, rales or rhonchi, symmetric air entry, no tachypnea, retractions or cyanosis  Heart - normal rate, regular rhythm, normal S1, S2, no murmurs, rubs, clicks or gallops  Neurological - motor and sensory grossly normal bilaterally  Musculoskeletal - full range of motion without pain    Results  Results for orders placed or performed during the hospital encounter of 01/31/18   POC URINE MACROSCOPIC   Result Value Ref Range    Glucose Negative NEGATIVE,Negative mg/dl    Bilirubin Small (A) NEGATIVE,Negative      Ketone Trace (A) NEGATIVE,Negative mg/dl    Specific gravity >=1.030 1.005 - 1.030      Blood Large (A) NEGATIVE,Negative      pH (UA) 6.0 5 - 9      Protein 100 (A) NEGATIVE,Negative mg/dl    Urobilinogen 0.2 0.0 - 1.0 EU/dl    Nitrites Negative NEGATIVE,Negative      Leukocyte Esterase Negative NEGATIVE,Negative      Color Red      Appearance Clear     POC HCG,URINE   Result Value Ref Range    HCG urine, QL negative NEGATIVE,Negative,negative     POC HEMATOCRIT   Result Value Ref Range    HCT 43 38 - 45 %    HGB 14.6 13.0 - 17.2 gm/dl       ASSESSMENT and PLAN    ICD-10-CM ICD-9-CM    1. Menorrhagia with irregular cycle N92.1 626.2 REFERRAL TO GYNECOLOGY   2.  PCOS (polycystic ovarian syndrome) E28.2 256.4      reviewed medications and side effects in detail    I have discussed the diagnosis with the patient and the intended plan of care as seen in the above orders. The patient has received an after-visit summary and questions were answered concerning future plans. I have discussed medication, side effects, and warnings with the patient in detail. The patient verbalized understanding and is in agreement with the plan of care. The patient will contact the office with any additional concerns.     Khai Palmer MD

## 2018-02-07 NOTE — PATIENT INSTRUCTIONS
Abnormal Uterine Bleeding: Care Instructions  Your Care Instructions    Abnormal uterine bleeding (AUB) is irregular bleeding from the uterus that is longer or heavier than usual or does not occur at your regular time. Sometimes it is caused by changes in hormone levels. It can also be caused by growths in the uterus, such as fibroids or polyps. Sometimes a cause cannot be found. You may have heavy bleeding when you are not expecting your period. Your doctor may suggest a pregnancy test, if you think you are pregnant. Follow-up care is a key part of your treatment and safety. Be sure to make and go to all appointments, and call your doctor if you are having problems. It's also a good idea to know your test results and keep a list of the medicines you take. How can you care for yourself at home? · Be safe with medicines. Take pain medicines exactly as directed. ¨ If the doctor gave you a prescription medicine for pain, take it as prescribed. ¨ If you are not taking a prescription pain medicine, ask your doctor if you can take an over-the-counter medicine. · You may be low in iron because of blood loss. Eat a balanced diet that is high in iron and vitamin C. Foods rich in iron include red meat, shellfish, eggs, beans, and leafy green vegetables. Talk to your doctor about whether you need to take iron pills or a multivitamin. When should you call for help? Call 911 anytime you think you may need emergency care. For example, call if:  ? · You passed out (lost consciousness). ?Call your doctor now or seek immediate medical care if:  ? · You have new or worse belly or pelvic pain. ? · You have severe vaginal bleeding. ? · You feel dizzy or lightheaded, or you feel like you may faint. ? Watch closely for changes in your health, and be sure to contact your doctor if:  ? · You think you may be pregnant. ? · Your bleeding gets worse. ? · You do not get better as expected.    Where can you learn more?  Go to http://nenita-anjali.info/. Enter T121 in the search box to learn more about \"Abnormal Uterine Bleeding: Care Instructions. \"  Current as of: October 13, 2016  Content Version: 11.4  © 2594-2128 [employer]. Care instructions adapted under license by [employer] (which disclaims liability or warranty for this information). If you have questions about a medical condition or this instruction, always ask your healthcare professional. [place] [address] any warranty or liability for your use of this information.

## 2018-02-26 ENCOUNTER — HOSPITAL ENCOUNTER (OUTPATIENT)
Dept: LAB | Age: 38
Discharge: HOME | End: 2018-02-26

## 2018-02-26 PROCEDURE — 99001 SPECIMEN HANDLING PT-LAB: CPT | Performed by: SURGERY

## 2018-04-06 ENCOUNTER — OFFICE VISIT (OUTPATIENT)
Dept: FAMILY MEDICINE CLINIC | Age: 38
End: 2018-04-06

## 2018-04-06 VITALS
WEIGHT: 247 LBS | HEIGHT: 66 IN | DIASTOLIC BLOOD PRESSURE: 84 MMHG | SYSTOLIC BLOOD PRESSURE: 123 MMHG | RESPIRATION RATE: 20 BRPM | OXYGEN SATURATION: 96 % | TEMPERATURE: 99.1 F | BODY MASS INDEX: 39.7 KG/M2 | HEART RATE: 88 BPM

## 2018-04-06 DIAGNOSIS — J06.9 VIRAL UPPER RESPIRATORY TRACT INFECTION: Primary | ICD-10-CM

## 2018-04-06 DIAGNOSIS — J30.2 SEASONAL ALLERGIC RHINITIS, UNSPECIFIED TRIGGER: ICD-10-CM

## 2018-04-06 DIAGNOSIS — Z76.0 MEDICATION REFILL: ICD-10-CM

## 2018-04-06 LAB
QUICKVUE INFLUENZA TEST: NEGATIVE
VALID INTERNAL CONTROL?: YES

## 2018-04-06 RX ORDER — LORATADINE 10 MG/1
10 TABLET ORAL DAILY
Qty: 30 TAB | Refills: 0 | Status: SHIPPED | OUTPATIENT
Start: 2018-04-06 | End: 2018-05-15 | Stop reason: SDUPTHER

## 2018-04-06 RX ORDER — LORATADINE 10 MG/1
10 TABLET ORAL DAILY
Qty: 30 TAB | Refills: 0 | Status: SHIPPED | OUTPATIENT
Start: 2018-04-06 | End: 2018-04-06 | Stop reason: SDUPTHER

## 2018-04-06 RX ORDER — FLUTICASONE PROPIONATE 50 MCG
2 SPRAY, SUSPENSION (ML) NASAL
Qty: 1 BOTTLE | Refills: 0 | Status: SHIPPED | OUTPATIENT
Start: 2018-04-06 | End: 2020-03-26 | Stop reason: SDUPTHER

## 2018-04-06 RX ORDER — FEXOFENADINE HCL 60 MG
TABLET ORAL
COMMUNITY
End: 2018-06-25

## 2018-04-06 RX ORDER — FLUTICASONE PROPIONATE 50 MCG
2 SPRAY, SUSPENSION (ML) NASAL
Qty: 1 BOTTLE | Refills: 0 | Status: SHIPPED | OUTPATIENT
Start: 2018-04-06 | End: 2018-04-06 | Stop reason: SDUPTHER

## 2018-04-06 NOTE — PROGRESS NOTES
OFFICE NOTE    Brissa Dorman is a 30 y.o. female presenting today for office visit. 4/6/2018  3:30 PM      Chief Complaint   Patient presents with    Nasal Congestion     pt here with c/o nasal congestion and drainage with scracty throat,eyes draining         HPI: Here today for cold. PCP Dr. Asher Guevara. Patient reports that she has been having left eye tenderness, eye itchiness, scratchy throat, congestion alternating with runny nose, sneezing, and temperatures of 99 for the last 2 days. She reports that she took Yousif Puls, and used her inhaler once-did not seem to help much. Did not get her flu shot this year. Review of Systems   Constitutional: Positive for fatigue and fever. Negative for chills. HENT: Positive for congestion, rhinorrhea and sneezing. Negative for ear pain, facial swelling, sinus pain and sinus pressure. +scratchy throat   Eyes: Positive for pain, discharge and itching. Negative for visual disturbance. Respiratory: Positive for cough. Negative for shortness of breath and wheezing. Cardiovascular: Negative for chest pain. Gastrointestinal: Negative for abdominal pain, diarrhea, nausea and vomiting. Musculoskeletal: Positive for myalgias. Negative for arthralgias. Skin: Negative for rash. Neurological: Negative for headaches. PHQ Screening   PHQ over the last two weeks 5/22/2017   Little interest or pleasure in doing things Not at all   Feeling down, depressed or hopeless -   Total Score PHQ 2 -         History  Past Medical History:   Diagnosis Date    Hypoglycemia     Ill-defined condition     GYN, polycystic ovarian disease.  PCOS (polycystic ovarian syndrome)     Psychiatric disorder     Anxiety       History reviewed. No pertinent surgical history. Social History     Social History    Marital status: SINGLE     Spouse name: N/A    Number of children: N/A    Years of education: N/A     Occupational History    Not on file. Social History Main Topics    Smoking status: Never Smoker    Smokeless tobacco: Never Used    Alcohol use Yes      Comment: Patient sates that she drinks wine.  Drug use: No    Sexual activity: Yes     Partners: Male      Comment: Male Partner had vasectomy. Other Topics Concern    Not on file     Social History Narrative       Allergies   Allergen Reactions    Amoxicillin Hives    Pork Derived (Porcine) Nausea and Vomiting       Current Outpatient Prescriptions   Medication Sig Dispense Refill    fexofenadine (ALLEGRA) 60 mg tablet Take  by mouth.  albuterol (PROVENTIL HFA, VENTOLIN HFA, PROAIR HFA) 90 mcg/actuation inhaler Take 2 Puffs by inhalation every six (6) hours as needed for Wheezing or Shortness of Breath. 1 Inhaler 0    ibuprofen (MOTRIN) 800 mg tablet Take 1 Tab by mouth every eight (8) hours as needed for Pain. 90 Tab 0    neomycin-polymyxin-hydrocortisone, buffered, (PEDIOTIC) 3.5-10,000-1 mg/mL-unit/mL-% otic suspension Administer 3 Drops in left ear four (4) times daily. 5 mL 0    prenatal vit-calcium-iron-fa (PREPLUS) 27 mg iron- 1 mg tab Take 1 Tab by mouth daily. 90 Tab 0    ORTHO TRI-CYCLEN, 28, 0.18/0.215/0.25 mg-35 mcg (28) tab TAKE 1 TABLET BY MOUTH DAILY  12    hydrocortisone (HYTONE) 2.5 % topical cream Apply  to affected area two (2) times a day. use thin layer 30 g 0    nystatin-triamcinolone (MYCOLOG) 100,000-0.1 unit/gram-% ointment Apply  to affected area two (2) times a day. 30 g 0    clotrimazole-betamethasone (LOTRISONE) topical cream Apply  to affected area two (2) times a day. 15 g 0    OPTICHAMBER ADULT MASK-LARGE sarah 1 EACH BY DOES NOT APPLY ROUTE AS NEEDED.  0    tretinoin (RETIN-A) 0.025 % topical cream       tiotropium (SPIRIVA WITH HANDIHALER) 18 mcg inhalation capsule Take 1 Cap by inhalation daily.  antipyrine-benzocaine (AURALGAN) otic solution Administer 3-4 Drops in left ear every two (2) hours as needed for Pain.       clindamycin (CLEOCIN T) 1 % external solution       clonazePAM (KLONOPIN) 0.5 mg tablet       fluconazole (DIFLUCAN) 150 mg tablet       prazosin (MINIPRESS) 1 mg capsule       fluticasone (FLONASE) 50 mcg/actuation nasal spray 2 Sprays by Both Nostrils route daily as needed for Rhinitis. 1 Bottle 1    cetirizine (ZYRTEC) 10 mg tablet Take 1 Tab by mouth daily as needed for Allergies. 20 Tab 0    inhalational spacing device 1 Each by Does Not Apply route as needed. 1 Each 0    metFORMIN (GLUCOPHAGE) 500 mg tablet Take 500 mg by mouth daily. Indications: POLYCYSTIC OVARIAN SYNDROME, Hyperglycemia      loratadine (CLARITIN) 10 mg tablet Take 1 Tab by mouth daily. 90 Tab 0    QUEtiapine SR (SEROQUEL XR) 150 mg sr tablet Take 1 Tab by mouth nightly. 90 Tab 0    OXcarbazepine (TRILEPTAL) 300 mg tablet Take 1 Tab by mouth daily. 90 Tab 0    hydrOXYzine HCl (ATARAX) 50 mg tablet Take 1 Tab by mouth three (3) times daily as needed for Itching. 90 Tab 3         Patient Care Team:  Patient Care Team:  Trip Vasquez MD as PCP - General (Family Practice)  Tevin Cesar MD (Obstetrics & Gynecology)  Erin Monterroso MD (Endocrinology)        LABS:  Results for orders placed or performed in visit on 04/06/18   AMB POC RAPID INFLUENZA TEST   Result Value Ref Range    VALID INTERNAL CONTROL POC Yes     QuickVue Influenza test Negative Negative       RADIOLOGY:  None new to review      Physical Exam   Constitutional: She is oriented to person, place, and time. She appears well-developed and well-nourished. No distress. HENT:   Right Ear: Tympanic membrane, external ear and ear canal normal.   Left Ear: Tympanic membrane, external ear and ear canal normal.   Nose: Mucosal edema and rhinorrhea present. Right sinus exhibits no maxillary sinus tenderness and no frontal sinus tenderness. Left sinus exhibits no maxillary sinus tenderness and no frontal sinus tenderness.    Mouth/Throat: Uvula is midline, oropharynx is clear and moist and mucous membranes are normal. No oropharyngeal exudate or posterior oropharyngeal erythema. Eyes: EOM are normal. Pupils are equal, round, and reactive to light. Right eye exhibits no discharge. Left eye exhibits no discharge. Neck: Normal range of motion. Neck supple. Cardiovascular: Normal rate, regular rhythm and normal heart sounds. No murmur heard. Pulmonary/Chest: Effort normal and breath sounds normal. No respiratory distress. Abdominal: Soft. Bowel sounds are normal. There is no tenderness. Lymphadenopathy:     She has no cervical adenopathy. Neurological: She is alert and oriented to person, place, and time. Skin: Skin is warm and dry. No rash noted. Vitals:    04/06/18 1506   BP: 123/84   Pulse: 88   Resp: 20   Temp: 99.1 °F (37.3 °C)   TempSrc: Oral   SpO2: 96%   Weight: 247 lb (112 kg)   Height: 5' 5.5\" (1.664 m)   PainSc:   5   PainLoc: Eye   LMP: 02/01/2018         Assessment and Plan    Viral upper respiratory tract infection vs Seasonal allergic rhinitis, unspecified trigger  *Discussed with patient about possible causes- most likely viral URI vs seasonal allergies. Encouraged symptom management with base management of antihistamine and Flonase. Noted Zyrtec, Claritin, and Allegra on chart, as well as Flonase- she requests Claritin and Flonase to be sent to pharmacy. - AMB POC RAPID INFLUENZA TEST  - loratadine (CLARITIN) 10 mg tablet; Take 1 Tab by mouth daily. Dispense: 30 Tab; Refill: 0  - fluticasone (FLONASE) 50 mcg/actuation nasal spray; 2 Sprays by Both Nostrils route daily as needed for Rhinitis or Allergies. Dispense: 1 Bottle; Refill: 0    Medication refill  - tiotropium (SPIRIVA WITH HANDIHALER) 18 mcg inhalation capsule; Take 1 Cap by inhalation daily. Dispense: 30 Cap; Refill: 0      *Plan of care reviewed with patient. Patient in agreement with plan and expresses understanding.  All questions answered and patient encouraged to call or RTO if further questions or concerns. Follow-up Disposition:  Return if symptoms worsen or fail to improve.

## 2018-04-06 NOTE — PATIENT INSTRUCTIONS
Managing Your Allergies: Care Instructions  Your Care Instructions    Managing your allergies is an important part of staying healthy. Your doctor will help you find out what may be causing the allergies. Common causes of allergy symptoms are house dust and dust mites, animal dander, mold, and pollen. As soon as you know what triggers your symptoms, try to reduce your exposure to your triggers. This can help prevent allergy symptoms, asthma, and other health problems. Ask your doctor about allergy medicine or immunotherapy. These treatments may help reduce or prevent allergy symptoms. Follow-up care is a key part of your treatment and safety. Be sure to make and go to all appointments, and call your doctor if you are having problems. It's also a good idea to know your test results and keep a list of the medicines you take. How can you care for yourself at home? · If you think that dust or dust mites are causing your allergies:  ¨ Wash sheets, pillowcases, and other bedding every week in hot water. ¨ Use airtight, dust-proof covers for pillows, duvets, and mattresses. Avoid plastic covers, because they tend to tear quickly and do not \"breathe. \" Wash according to the instructions. ¨ Remove extra blankets and pillows that you don't need. ¨ Use blankets that are machine-washable. ¨ Don't use home humidifiers. They can help mites live longer. · Use air-conditioning. Change or clean all filters every month. Keep windows closed. Use high-efficiency air filters. Don't use window or attic fans, which draw dust into the air. · If you're allergic to pet dander, keep pets outside or, at the very least, out of your bedroom. Old carpet and cloth-covered furniture can hold a lot of animal dander. You may need to replace them. · Look for signs of cockroaches. Use cockroach baits to get rid of them. Then clean your home well. · If you're allergic to mold, don't keep indoor plants, because molds can grow in soil. Get rid of furniture, rugs, and drapes that smell musty. Check for mold in the bathroom. · If you're allergic to pollen, stay inside when pollen counts are high. · Don't smoke or let anyone else smoke in your house. Don't use fireplaces or wood-burning stoves. Avoid paint fumes, perfumes, and other strong odors. When should you call for help? Give an epinephrine shot if:  ? · You think you are having a severe allergic reaction. ? After giving an epinephrine shot call 911, even if you feel better. ?Call 911 if:  ? · You have symptoms of a severe allergic reaction. These may include:  ¨ Sudden raised, red areas (hives) all over your body. ¨ Swelling of the throat, mouth, lips, or tongue. ¨ Trouble breathing. ¨ Passing out (losing consciousness). Or you may feel very lightheaded or suddenly feel weak, confused, or restless. ? · You have been given an epinephrine shot, even if you feel better. ?Call your doctor now or seek immediate medical care if:  ? · You have symptoms of an allergic reaction, such as:  ¨ A rash or hives (raised, red areas on the skin). ¨ Itching. ¨ Swelling. ¨ Belly pain, nausea, or vomiting. ? Watch closely for changes in your health, and be sure to contact your doctor if:  ? · Your allergies get worse. ? · You need help controlling your allergies. ? · You have questions about allergy testing. ? · You do not get better as expected. Where can you learn more? Go to http://nenita-anjali.info/. Enter L249 in the search box to learn more about \"Managing Your Allergies: Care Instructions. \"  Current as of: September 29, 2016  Content Version: 11.4  © 6746-8106 [employer]. Care instructions adapted under license by [employer] (which disclaims liability or warranty for this information).  If you have questions about a medical condition or this instruction, always ask your healthcare professional. Randy Rojo any warranty or liability for your use of this information.

## 2018-04-06 NOTE — MR AVS SNAPSHOT
[facility] [address] [facility] 
[phone] Patient: Kye Frye MRN: [id number] MBI:[id number] Visit Information Date & Time Provider Department Dept. Phone Encounter #  
 4/6/2018  3:00 PM Rozina Meredith NP [facility] [phone] [id number] Follow-up Instructions Return if symptoms worsen or fail to improve. Your Appointments 4/18/2018  3:30 PM  
New Patient with Lowell Arellano MD  
[facility] ([address] [facility]) Appt Note: np alisa [address], [place] [address] [facility] [address]  
[phone]  
  
   
 [address], [address] [address] Hali Stephens Upcoming Health Maintenance Date Due Influenza Age 5 to Adult 8/1/2017 PAP AKA CERVICAL CYTOLOGY 7/26/2020 DTaP/Tdap/Td series (2 - Td) 4/1/2025 Allergies as of 4/6/2018  Review Complete On: 4/6/2018 By: Rozina Meredith NP Severity Noted Reaction Type Reactions Amoxicillin  12/19/2016    Hives Pork Derived (Porcine)  03/15/2017    Nausea and Vomiting Current Immunizations  Never Reviewed Name Date Influenza Vaccine (Quad) PF 1/6/2017 Not reviewed this visit You Were Diagnosed With   
  
 Codes Comments Seasonal allergic rhinitis, unspecified trigger    -  Primary ICD-10-CM: J30.2 ICD-9-CM: 477.9 Vitals BP Pulse Temp Resp Height(growth percentile) Weight(growth percentile) 123/84 (BP 1 Location: Left arm, BP Patient Position: Sitting) 88 99.1 °F (37.3 °C) (Oral) 20 5' 5.5\" (1.664 m) 247 lb (112 kg) LMP SpO2 BMI OB Status Smoking Status 02/01/2018 96% 40.48 kg/m2 Ablation Never Smoker Vitals History BMI and BSA Data Body Mass Index Body Surface Area 40.48 kg/m 2 2.28 m 2 Preferred Pharmacy Pharmacy Name Phone  [phone] [facility] [address] [facility] [phone] Your Updated Medication List  
  
   
This list is accurate as of 4/6/18  3:53 PM.  Always use your most recent med list.  
  
  
  
  
 albuterol 90 mcg/actuation inhaler Commonly known as:  PROVENTIL HFA, VENTOLIN HFA, PROAIR HFA Take 2 Puffs by inhalation every six (6) hours as needed for Wheezing or Shortness of Breath. antipyrine-benzocaine otic solution Commonly known as:  Swathi Barrett Administer 3-4 Drops in left ear every two (2) hours as needed for Pain. cetirizine 10 mg tablet Commonly known as:  ZYRTEC Take 1 Tab by mouth daily as needed for Allergies. clindamycin 1 % external solution Commonly known as:  CLEOCIN T  
  
 clonazePAM 0.5 mg tablet Commonly known as:  KlonoPIN  
  
 clotrimazole-betamethasone topical cream  
Commonly known as:  Guerline Guadeloupe Apply  to affected area two (2) times a day. fexofenadine 60 mg tablet Commonly known as:  Learta Nathaniel Take  by mouth. fluconazole 150 mg tablet Commonly known as:  DIFLUCAN  
  
 fluticasone 50 mcg/actuation nasal spray Commonly known as:  Leafy Few 2 Sprays by Both Nostrils route daily as needed for Rhinitis or Allergies. hydrocortisone 2.5 % topical cream  
Commonly known as:  HYTONE Apply  to affected area two (2) times a day. use thin layer  
  
 hydrOXYzine HCl 50 mg tablet Commonly known as:  ATARAX Take 1 Tab by mouth three (3) times daily as needed for Itching. ibuprofen 800 mg tablet Commonly known as:  MOTRIN Take 1 Tab by mouth every eight (8) hours as needed for Pain.  
  
 inhalational spacing device 1 Each by Does Not Apply route as needed. * loratadine 10 mg tablet Commonly known as:  Robert Moulder Take 1 Tab by mouth daily. * loratadine 10 mg tablet Commonly known as:  Robert Moulder Take 1 Tab by mouth daily. metFORMIN 500 mg tablet Commonly known as:  GLUCOPHAGE  
 Take 500 mg by mouth daily. Indications: POLYCYSTIC OVARIAN SYNDROME, Hyperglycemia  
  
 neomycin-polymyxin-hydrocortisone (buffered) 3.5-10,000-1 mg/mL-unit/mL-% otic suspension Commonly known as:  Prachi Kaur Administer 3 Drops in left ear four (4) times daily. nystatin-triamcinolone 100,000-0.1 unit/gram-% ointment Commonly known as:  Si Prashanth Apply  to affected area two (2) times a day. [address] [place] [address] Generic drug:  Wyn Slim 1 EACH BY DOES NOT APPLY ROUTE AS NEEDED. ORTHO TRI-CYCLEN (28) 0.18/0.215/0.25 mg-35 mcg (28) Tab Generic drug:  norgestimate-ethinyl estradiol TAKE 1 TABLET BY MOUTH DAILY OXcarbazepine 300 mg tablet Commonly known as:  TRILEPTAL Take 1 Tab by mouth daily. prazosin 1 mg capsule Commonly known as:  MINIPRESS  
  
 prenatal vit-calcium-iron-fa 27 mg iron- 1 mg Tab Commonly known as:  PREPLUS Take 1 Tab by mouth daily. QUEtiapine  mg sr tablet Commonly known as:  SEROquel XR Take 1 Tab by mouth nightly. SPIRIVA WITH HANDIHALER 18 mcg inhalation capsule Generic drug:  tiotropium Take 1 Cap by inhalation daily. tretinoin 0.025 % topical cream  
Commonly known as:  RETIN-A  
  
 * Notice: This list has 2 medication(s) that are the same as other medications prescribed for you. Read the directions carefully, and ask your doctor or other care provider to review them with you. Prescriptions Sent to Pharmacy Refills  
 loratadine (CLARITIN) 10 mg tablet 0 Sig: Take 1 Tab by mouth daily. Class: Normal  
 Pharmacy: [facility] [address] [place] [address] [facility] #: [phone] Route: Oral  
 fluticasone (FLONASE) 50 mcg/actuation nasal spray 0 Si Sprays by Both Nostrils route daily as needed for Rhinitis or Allergies.   
 Class: Normal  
 Pharmacy: [facility] [address] #: [phone] Route: Both Nostrils We Performed the Following AMB POC RAPID INFLUENZA TEST [03802 CPT(R)] Follow-up Instructions Return if symptoms worsen or fail to improve. Patient Instructions Managing Your Allergies: Care Instructions Your Care Instructions Managing your allergies is an important part of staying healthy. Your doctor will help you find out what may be causing the allergies. Common causes of allergy symptoms are house dust and dust mites, animal dander, mold, and pollen. As soon as you know what triggers your symptoms, try to reduce your exposure to your triggers. This can help prevent allergy symptoms, asthma, and other health problems. Ask your doctor about allergy medicine or immunotherapy. These treatments may help reduce or prevent allergy symptoms. Follow-up care is a key part of your treatment and safety. Be sure to make and go to all appointments, and call your doctor if you are having problems. It's also a good idea to know your test results and keep a list of the medicines you take. How can you care for yourself at home? · If you think that dust or dust mites are causing your allergies: 
¨ Wash sheets, pillowcases, and other bedding every week in hot water. ¨ Use airtight, dust-proof covers for pillows, duvets, and mattresses. Avoid plastic covers, because they tend to tear quickly and do not \"breathe. \" Wash according to the instructions. ¨ Remove extra blankets and pillows that you don't need. ¨ Use blankets that are machine-washable. ¨ Don't use home humidifiers. They can help mites live longer. · Use air-conditioning. Change or clean all filters every month. Keep windows closed. Use high-efficiency air filters. Don't use window or attic fans, which draw dust into the air. · If you're allergic to pet dander, keep pets outside or, at the very least, out of your bedroom. Old carpet and cloth-covered furniture can hold a lot of animal dander. You may need to replace them. · Look for signs of cockroaches. Use cockroach baits to get rid of them. Then clean your home well. · If you're allergic to mold, don't keep indoor plants, because molds can grow in soil. Get rid of furniture, rugs, and drapes that smell musty. Check for mold in the bathroom. · If you're allergic to pollen, stay inside when pollen counts are high. · Don't smoke or let anyone else smoke in your house. Don't use fireplaces or wood-burning stoves. Avoid paint fumes, perfumes, and other strong odors. When should you call for help? Give an epinephrine shot if: 
? · You think you are having a severe allergic reaction. ? After giving an epinephrine shot call 911, even if you feel better. ?Call 911 if: 
? · You have symptoms of a severe allergic reaction. These may include: 
¨ Sudden raised, red areas (hives) all over your body. ¨ Swelling of the throat, mouth, lips, or tongue. ¨ Trouble breathing. ¨ Passing out (losing consciousness). Or you may feel very lightheaded or suddenly feel weak, confused, or restless. ? · You have been given an epinephrine shot, even if you feel better. ?Call your doctor now or seek immediate medical care if: 
? · You have symptoms of an allergic reaction, such as: ¨ A rash or hives (raised, red areas on the skin). ¨ Itching. ¨ Swelling. ¨ Belly pain, nausea, or vomiting. ? Watch closely for changes in your health, and be sure to contact your doctor if: 
? · Your allergies get worse. ? · You need help controlling your allergies. ? · You have questions about allergy testing. ? · You do not get better as expected. Where can you learn more? Go to http://nenita-anjali.info/.  
Enter L249 in the search box to learn more about \"Managing Your Allergies: Care Instructions. \" Current as of: September 29, 2016 Content Version: 11.4 © 2766-7655 [employer]. Care instructions adapted under license by [employer] (which disclaims liability or warranty for this information). If you have questions about a medical condition or this instruction, always ask your healthcare professional. [address] any warranty or liability for your use of this information. Introducing [facility]! Dear Yanique Vee: Thank you for requesting a [employer] account. Our records indicate that you already have an active [employer] account. You can access your account anytime at [email or web address] Did you know that you can access your hospital and ER discharge instructions at any time in [employer]? You can also review all of your test results from your hospital stay or ER visit. Additional Information If you have questions, please visit the Frequently Asked Questions section of the [employer] website at [email or web address] Remember, [employer] is NOT to be used for urgent needs. For medical emergencies, dial 911. Now available from your iPhone and Android! Please provide this summary of care documentation to your next provider. Your primary care clinician is listed as Estella Lu. If you have any questions after today's visit, please call [phone].

## 2018-04-09 ENCOUNTER — OFFICE VISIT (OUTPATIENT)
Dept: FAMILY MEDICINE CLINIC | Age: 38
End: 2018-04-09

## 2018-04-09 VITALS
TEMPERATURE: 97.6 F | WEIGHT: 247 LBS | DIASTOLIC BLOOD PRESSURE: 78 MMHG | HEART RATE: 83 BPM | HEIGHT: 66 IN | RESPIRATION RATE: 18 BRPM | OXYGEN SATURATION: 98 % | SYSTOLIC BLOOD PRESSURE: 109 MMHG | BODY MASS INDEX: 39.7 KG/M2

## 2018-04-09 DIAGNOSIS — J32.4 PANSINUSITIS, UNSPECIFIED CHRONICITY: Primary | ICD-10-CM

## 2018-04-09 DIAGNOSIS — E66.01 MORBID OBESITY (HCC): ICD-10-CM

## 2018-04-09 RX ORDER — AZITHROMYCIN 250 MG/1
TABLET, FILM COATED ORAL
Qty: 6 TAB | Refills: 0 | Status: SHIPPED | OUTPATIENT
Start: 2018-04-09 | End: 2018-06-27 | Stop reason: SDUPTHER

## 2018-04-09 NOTE — MR AVS SNAPSHOT
[facility] [address] [facility] 
[phone] Patient: Nuris Doshi MRN: [id number] [id number] Visit Information Date & Time Provider Department Dept. Phone Encounter #  
 4/9/2018  5:30 PM MD Shikha Grant [phone] [id number] Follow-up Instructions Return if symptoms worsen or fail to improve. Your Appointments 4/10/2018  3:30 PM  
Follow Up with MD Shikha Grant ([facility]) Appt Note: follow up; follow up  
 [address]  
  
   
 [address] [facility] 4/18/2018  3:30 PM  
New Patient with Nehemiah Mirza MD  
[facility] OB GYN ([facility]) Appt Note: np alisa [address], [place] [facility] [phone]  
  
   
 [address], [address] 4300 [facility] Upcoming Health Maintenance Date Due Influenza Age 5 to Adult 8/1/2017 PAP AKA CERVICAL CYTOLOGY 7/26/2020 DTaP/Tdap/Td series (2 - Td) 4/1/2025 Allergies as of 4/9/2018  Review Complete On: 4/9/2018 By: Dylan Glynn MD  
  
 Severity Noted Reaction Type Reactions Amoxicillin  12/19/2016    Hives Pork Derived (Porcine)  03/15/2017    Nausea and Vomiting Current Immunizations  Never Reviewed Name Date Influenza Vaccine (Quad) PF 1/6/2017 Not reviewed this visit You Were Diagnosed With   
  
 Codes Comments Pansinusitis, unspecified chronicity    -  Primary ICD-10-CM: J32.4 ICD-9-CM: 473.8 Morbid obesity ([facility] [address]     ICD-10-CM: E66.01 
ICD-9-CM: 278.01 Vitals BP Pulse Temp Resp Height(growth percentile) Weight(growth percentile) 109/78 (BP 1 Location: Left arm, BP Patient Position: Sitting) 83 97.6 °F (36.4 °C) (Oral) 18 5' 5.5\" (1.664 m) 247 lb (112 kg) SpO2 BMI OB Status Smoking Status 98% 40.48 kg/m2 Ablation Never Smoker BMI and BSA Data Body Mass Index Body Surface Area 40.48 kg/m 2 2.28 m 2 Preferred Pharmacy Pharmacy Name Phone [facility] #[id number]- [facility] [address] [phone] Your Updated Medication List  
  
   
This list is accurate as of 4/9/18  5:45 PM.  Always use your most recent med list.  
  
  
  
  
 albuterol 90 mcg/actuation inhaler Commonly known as:  PROVENTIL HFA, VENTOLIN HFA, PROAIR HFA Take 2 Puffs by inhalation every six (6) hours as needed for Wheezing or Shortness of Breath. antipyrine-benzocaine otic solution Commonly known as:  Beau Petite Administer 3-4 Drops in left ear every two (2) hours as needed for Pain. azithromycin 250 mg tablet Commonly known as:  Manjit Cramp Take 2 tablets today, then take 1 tablet daily  
  
 cetirizine 10 mg tablet Commonly known as:  ZYRTEC Take 1 Tab by mouth daily as needed for Allergies. clindamycin 1 % external solution Commonly known as:  CLEOCIN T  
  
 clonazePAM 0.5 mg tablet Commonly known as:  KlonoPIN  
  
 clotrimazole-betamethasone topical cream  
Commonly known as:  Osvaldo Yabucoa Apply  to affected area two (2) times a day. fexofenadine 60 mg tablet Commonly known as:  Philo Fickle Take  by mouth. fluticasone 50 mcg/actuation nasal spray Commonly known as:  Beena Lloyd 2 Sprays by Both Nostrils route daily as needed for Rhinitis or Allergies. hydrocortisone 2.5 % topical cream  
Commonly known as:  HYTONE Apply  to affected area two (2) times a day. use thin layer  
  
 ibuprofen 800 mg tablet Commonly known as:  MOTRIN Take 1 Tab by mouth every eight (8) hours as needed for Pain.  
  
 inhalational spacing device 1 Each by Does Not Apply route as needed. * loratadine 10 mg tablet Commonly known as:  Alveta Susan Take 1 Tab by mouth daily. * loratadine 10 mg tablet Commonly known as:  Sylvester Wilson Take 1 Tab by mouth daily. metFORMIN 500 mg tablet Commonly known as:  GLUCOPHAGE Take 500 mg by mouth daily. Indications: POLYCYSTIC OVARIAN SYNDROME, Hyperglycemia  
  
 neomycin-polymyxin-hydrocortisone (buffered) 3.5-10,000-1 mg/mL-unit/mL-% otic suspension Commonly known as:  Eric Chandler Administer 3 Drops in left ear four (4) times daily. nystatin-triamcinolone 100,000-0.1 unit/gram-% ointment Commonly known as:  Justine Roberts Apply  to affected area two (2) times a day. [address] [place] [address] Generic drug:  Lenell Jim 1 EACH BY DOES NOT APPLY ROUTE AS NEEDED. ORTHO TRI-CYCLEN (28) 0.18/0.215/0.25 mg-35 mcg (28) Tab Generic drug:  norgestimate-ethinyl estradiol TAKE 1 TABLET BY MOUTH DAILY OXcarbazepine 300 mg tablet Commonly known as:  TRILEPTAL Take 1 Tab by mouth daily. prazosin 1 mg capsule Commonly known as:  MINIPRESS  
  
 prenatal vit-calcium-iron-fa 27 mg iron- 1 mg Tab Commonly known as:  PREPLUS Take 1 Tab by mouth daily. QUEtiapine  mg sr tablet Commonly known as:  SEROquel XR Take 1 Tab by mouth nightly. tiotropium 18 mcg inhalation capsule Commonly known as:  [address] Take 1 Cap by inhalation daily. tretinoin 0.025 % topical cream  
Commonly known as:  RETIN-A  
  
 * Notice: This list has 2 medication(s) that are the same as other medications prescribed for you. Read the directions carefully, and ask your doctor or other care provider to review them with you. Prescriptions Sent to Pharmacy Refills  
 azithromycin (ZITHROMAX) 250 mg tablet 0 Sig: Take 2 tablets today, then take 1 tablet daily Class: Normal  
 Pharmacy: [facility], [place] [address] [place] [address] #: [phone] Follow-up Instructions Return if symptoms worsen or fail to improve. Introducing [facility] & HEALTH SERVICES! Dear Azar Vivar: Thank you for requesting a eLifestyles account. Our records indicate that you already have an active eLifestyles account. You can access your account anytime at [email or web address] Did you know that you can access your hospital and ER discharge instructions at any time in eLifestyles? You can also review all of your test results from your hospital stay or ER visit. Additional Information If you have questions, please visit the Frequently Asked Questions section of the eLifestyles website at [email or web address] Remember, eLifestyles is NOT to be used for urgent needs. For medical emergencies, dial 911. Now available from your iPhone and Android! Please provide this summary of care documentation to your next provider. Your primary care clinician is listed as Estella Lu. If you have any questions after today's visit, please call [phone].

## 2018-04-09 NOTE — PATIENT INSTRUCTIONS
Body Mass Index: Care Instructions  Your Care Instructions    Body mass index (BMI) can help you see if your weight is raising your risk for health problems. It uses a formula to compare how much you weigh with how tall you are. · A BMI lower than 18.5 is considered underweight. · A BMI between 18.5 and 24.9 is considered healthy. · A BMI between 25 and 29.9 is considered overweight. A BMI of 30 or higher is considered obese. If your BMI is in the normal range, it means that you have a lower risk for weight-related health problems. If your BMI is in the overweight or obese range, you may be at increased risk for weight-related health problems, such as high blood pressure, heart disease, stroke, arthritis or joint pain, and diabetes. If your BMI is in the underweight range, you may be at increased risk for health problems such as fatigue, lower protection (immunity) against illness, muscle loss, bone loss, hair loss, and hormone problems. BMI is just one measure of your risk for weight-related health problems. You may be at higher risk for health problems if you are not active, you eat an unhealthy diet, or you drink too much alcohol or use tobacco products. Follow-up care is a key part of your treatment and safety. Be sure to make and go to all appointments, and call your doctor if you are having problems. It's also a good idea to know your test results and keep a list of the medicines you take. How can you care for yourself at home? · Practice healthy eating habits. This includes eating plenty of fruits, vegetables, whole grains, lean protein, and low-fat dairy. · If your doctor recommends it, get more exercise. Walking is a good choice. Bit by bit, increase the amount you walk every day. Try for at least 30 minutes on most days of the week. · Do not smoke. Smoking can increase your risk for health problems. If you need help quitting, talk to your doctor about stop-smoking programs and medicines. These can increase your chances of quitting for good. · Limit alcohol to 2 drinks a day for men and 1 drink a day for women. Too much alcohol can cause health problems. If you have a BMI higher than 25  · Your doctor may do other tests to check your risk for weight-related health problems. This may include measuring the distance around your waist. A waist measurement of more than 40 inches in men or 35 inches in women can increase the risk of weight-related health problems. · Talk with your doctor about steps you can take to stay healthy or improve your health. You may need to make lifestyle changes to lose weight and stay healthy, such as changing your diet and getting regular exercise. If you have a BMI lower than 18.5  · Your doctor may do other tests to check your risk for health problems. · Talk with your doctor about steps you can take to stay healthy or improve your health. You may need to make lifestyle changes to gain or maintain weight and stay healthy, such as getting more healthy foods in your diet and doing exercises to build muscle. Where can you learn more? Go to http://nenita-anjali.info/. Enter S176 in the search box to learn more about \"Body Mass Index: Care Instructions. \"  Current as of: October 13, 2016  Content Version: 11.4  © 8408-2482 Healthwise, Incorporated. Care instructions adapted under license by [employer] (which disclaims liability or warranty for this information). If you have questions about a medical condition or this instruction, always ask your healthcare professional. [address] any warranty or liability for your use of this information.

## 2018-04-09 NOTE — PROGRESS NOTES
Chief Complaint   Patient presents with    Cold Symptoms     patient was seen last friday for cold symptoms. she states that she is not getting better. [facility]  Moe Marin comes in for follow-up care. Patient seen last week with nasal congestion and sneezing. Was put on medication and nasal spray that he has been using without much relief. States that the frontal and nasal sinus areas are now more tender with postnasal discharge that is mucopurulent in nature. She has a sore throat that is getting worse. Has a cough that is productive of thick yellow phlegm. She did have flu test done that was negative. States that she has malaise and fatigue and fever and chills. Feels that he needs to take an antibiotic. I discussed cold symptoms and the expected recovery. I discussed the use of antibiotics. Patient would prefer to try this. I will put her on a Z-Wojciech. She will continue with the other medications that she had been given. I will follow-up as needed. Morbid obesity: Patient's BMI is 40.48. We discussed normal BMI. Discussed morbid obesity. Patient will do lifestyle and dietary modification in an effort to cut down on her weight and the I will follow-up  at next visit. Past Medical History  Past Medical History:   Diagnosis Date    Hypoglycemia     Ill-defined condition     GYN, polycystic ovarian disease.  PCOS (polycystic ovarian syndrome)     Psychiatric disorder     Anxiety       Surgical History  No past surgical history on file. Medications  Current Outpatient Prescriptions   Medication Sig Dispense Refill    fexofenadine (ALLEGRA) 60 mg tablet Take  by mouth.  tiotropium (SPIRIVA WITH HANDIHALER) 18 mcg inhalation capsule Take 1 Cap by inhalation daily. 30 Cap 0    loratadine (CLARITIN) 10 mg tablet Take 1 Tab by mouth daily. 30 Tab 0    fluticasone (FLONASE) 50 mcg/actuation nasal spray 2 Sprays by Both Nostrils route daily as needed for Rhinitis or Allergies.  1 Bottle 0    ibuprofen (MOTRIN) 800 mg tablet Take 1 Tab by mouth every eight (8) hours as needed for Pain. 90 Tab 0    neomycin-polymyxin-hydrocortisone, buffered, (PEDIOTIC) 3.5-10,000-1 mg/mL-unit/mL-% otic suspension Administer 3 Drops in left ear four (4) times daily. 5 mL 0    prenatal vit-calcium-iron-fa (PREPLUS) 27 mg iron- 1 mg tab Take 1 Tab by mouth daily. 90 Tab 0    ORTHO TRI-CYCLEN, 28, 0.18/0.215/0.25 mg-35 mcg (28) tab TAKE 1 TABLET BY MOUTH DAILY  12    hydrocortisone (HYTONE) 2.5 % topical cream Apply  to affected area two (2) times a day. use thin layer 30 g 0    nystatin-triamcinolone (MYCOLOG) 100,000-0.1 unit/gram-% ointment Apply  to affected area two (2) times a day. 30 g 0    clotrimazole-betamethasone (LOTRISONE) topical cream Apply  to affected area two (2) times a day. 15 g 0    OPTICHAMBER ADULT MASK-LARGE sarah 1 EACH BY DOES NOT APPLY ROUTE AS NEEDED.  0    tretinoin (RETIN-A) 0.025 % topical cream       antipyrine-benzocaine (AURALGAN) otic solution Administer 3-4 Drops in left ear every two (2) hours as needed for Pain.  clindamycin (CLEOCIN T) 1 % external solution       clonazePAM (KLONOPIN) 0.5 mg tablet       fluconazole (DIFLUCAN) 150 mg tablet       prazosin (MINIPRESS) 1 mg capsule       cetirizine (ZYRTEC) 10 mg tablet Take 1 Tab by mouth daily as needed for Allergies. 20 Tab 0    albuterol (PROVENTIL HFA, VENTOLIN HFA, PROAIR HFA) 90 mcg/actuation inhaler Take 2 Puffs by inhalation every six (6) hours as needed for Wheezing or Shortness of Breath. 1 Inhaler 0    inhalational spacing device 1 Each by Does Not Apply route as needed. 1 Each 0    metFORMIN (GLUCOPHAGE) 500 mg tablet Take 500 mg by mouth daily. Indications: POLYCYSTIC OVARIAN SYNDROME, Hyperglycemia      loratadine (CLARITIN) 10 mg tablet Take 1 Tab by mouth daily. 90 Tab 0    QUEtiapine SR (SEROQUEL XR) 150 mg sr tablet Take 1 Tab by mouth nightly.  90 Tab 0    OXcarbazepine (TRILEPTAL) 300 mg tablet Take 1 Tab by mouth daily. 90 Tab 0    hydrOXYzine HCl (ATARAX) 50 mg tablet Take 1 Tab by mouth three (3) times daily as needed for Itching. 90 Tab 3       Allergies  Allergies   Allergen Reactions    Amoxicillin Hives    Pork Derived (Porcine) Nausea and Vomiting       Family History  No family history on file. Social History  Social History     Social History    Marital status: SINGLE     Spouse name: N/A    Number of children: N/A    Years of education: N/A     Occupational History    Not on file. Social History Main Topics    Smoking status: Never Smoker    Smokeless tobacco: Never Used    Alcohol use Yes      Comment: Patient sates that she drinks wine.  Drug use: No    Sexual activity: Yes     Partners: Male      Comment: Male Partner had vasectomy.      Other Topics Concern    Not on file     Social History Narrative       Review of Systems  Review of Systems - History obtained from chart review and the patient  General ROS: positive for  - chills, fatigue, fever and malaise  Psychological ROS: negative  Ophthalmic ROS: positive for - excessive tearing and itchy eyes  ENT ROS: positive for - nasal congestion, nasal discharge, sinus pain, sneezing and sore throat  Allergy and Immunology ROS: positive for - nasal congestion and postnasal drip  Hematological and Lymphatic ROS: negative  Endocrine ROS: PCOS  Respiratory ROS: positive for - cough and sputum changes  Cardiovascular ROS: negative  Gastrointestinal ROS: no abdominal pain, change in bowel habits, or black or bloody stools  Genito-Urinary ROS: no dysuria, trouble voiding, or hematuria  Musculoskeletal ROS: negative  Neurological ROS: negative    Vital Signs  Visit Vitals    /78 (BP 1 Location: Left arm, BP Patient Position: Sitting)    Pulse 83    Temp 97.6 °F (36.4 °C) (Oral)    Resp 18    Ht 5' 5.5\" (1.664 m)    Wt 247 lb (112 kg)    SpO2 98%    BMI 40.48 kg/m2         Physical Exam  Physical Examination: General appearance - oriented to person, place, and time, overweight, acyanotic, in no respiratory distress and well hydrated  Mental status - alert, oriented to person, place, and time, normal mood, behavior, speech, dress, motor activity, and thought processes  Eyes - pupils equal and reactive, extraocular eye movements intact  Ears - bilateral TM's and external ear canals normal, hearing grossly normal bilaterally  Nose - mucosal congestion, mucosal erythema, purulent rhinorrhea and sinus tenderness noted frontal and maxillary. Mouth - mucous membranes moist, pharynx normal without lesions  Neck - supple, no significant adenopathy  Lymphatics - no palpable lymphadenopathy  Chest - clear to auscultation, no wheezes, rales or rhonchi, symmetric air entry  Heart - normal rate, regular rhythm, normal S1, S2, no murmurs, rubs, clicks or gallops  Neurological - alert, oriented, normal speech, no focal findings or movement disorder noted  Musculoskeletal - no joint tenderness, deformity or swelling  Extremities - intact peripheral pulses    Results  Results for orders placed or performed in visit on 04/06/18   AMB POC RAPID INFLUENZA TEST   Result Value Ref Range    VALID INTERNAL CONTROL POC Yes     QuickVue Influenza test Negative Negative       ASSESSMENT and PLAN  There are no diagnoses linked to this encounter. ICD-10-CM ICD-9-CM    1. Pansinusitis, unspecified chronicity J32.4 473.8 azithromycin (ZITHROMAX) 250 mg tablet   2. Morbid obesity ([facility] [address] E66.01 278.01      Discussed the patient's BMI with her. The BMI follow up plan is as follows:     dietary management education, guidance, and counseling  encourage exercise  monitor weight  reviewed diet, exercise and weight control  reviewed medications and side effects in detail    I have discussed the diagnosis with the patient and the intended plan of care as seen in the above orders.  The patient has received an after-visit summary and questions were answered concerning future plans. I have discussed medication, side effects, and warnings with the patient in detail. The patient verbalized understanding and is in agreement with the plan of care. The patient will contact the office with any additional concerns.     Danny Lou MD

## 2018-05-08 ENCOUNTER — TELEPHONE (OUTPATIENT)
Dept: FAMILY MEDICINE CLINIC | Age: 38
End: 2018-05-08

## 2018-05-08 NOTE — TELEPHONE ENCOUNTER
Called patient no answer at this time. LVM for patient to return phone call to clarify what exactly is she requesting.  Do patient need an referral sent to [facility]

## 2018-05-08 NOTE — TELEPHONE ENCOUNTER
Pt called in regards to the weight loss and was told Dr Rossana Pavon can write a letter or fax an order to [facility] [address] [phone] in [place].  Fax non [phone] Please call patient back to discuss when available

## 2018-05-10 ENCOUNTER — TELEPHONE (OUTPATIENT)
Dept: FAMILY MEDICINE CLINIC | Age: 38
End: 2018-05-10

## 2018-05-10 NOTE — TELEPHONE ENCOUNTER
Patient called back to clarify she needs [facility]  Patient is requesting to speak to the nurse as soon as possible

## 2018-05-15 DIAGNOSIS — E66.01 MORBID OBESITY (HCC): Primary | ICD-10-CM

## 2018-05-15 DIAGNOSIS — J30.2 SEASONAL ALLERGIC RHINITIS, UNSPECIFIED TRIGGER: ICD-10-CM

## 2018-05-16 RX ORDER — LORATADINE 10 MG/1
TABLET ORAL
Qty: 30 TAB | Refills: 0 | Status: SHIPPED | OUTPATIENT
Start: 2018-05-16 | End: 2018-06-25 | Stop reason: SDUPTHER

## 2018-05-24 ENCOUNTER — TELEPHONE (OUTPATIENT)
Dept: FAMILY MEDICINE CLINIC | Age: 38
End: 2018-05-24

## 2018-05-24 NOTE — TELEPHONE ENCOUNTER
Patient regarding her referral to [facility]. She states she's having back pain due to her breasts. She plans on having breast reduction surgery. It was recommended that she lose weight prior to the surgery.  Patient can be reached [phone]

## 2018-05-24 NOTE — TELEPHONE ENCOUNTER
Patient is has a current nutrient referral with [facility] and is requesting a referral for weight loss regimen also with [facility]. Her appointment is May 31, 2018 but want to know if she can also be seen for the weight loss management  Kamilla from [facility] stated the patient is asking her to call the PCP to see if Dr Ed Rodrigez would approve her to be seen. Please contact Corinne Severe when available.  Please leave a voicemail if not available

## 2018-05-25 DIAGNOSIS — M54.5 CHRONIC LOW BACK PAIN, UNSPECIFIED BACK PAIN LATERALITY, WITH SCIATICA PRESENCE UNSPECIFIED: Primary | ICD-10-CM

## 2018-05-25 DIAGNOSIS — G89.29 CHRONIC LOW BACK PAIN, UNSPECIFIED BACK PAIN LATERALITY, WITH SCIATICA PRESENCE UNSPECIFIED: Primary | ICD-10-CM

## 2018-06-25 ENCOUNTER — OFFICE VISIT (OUTPATIENT)
Dept: FAMILY MEDICINE CLINIC | Age: 38
End: 2018-06-25

## 2018-06-25 VITALS
WEIGHT: 243 LBS | BODY MASS INDEX: 40.48 KG/M2 | TEMPERATURE: 99.1 F | OXYGEN SATURATION: 96 % | SYSTOLIC BLOOD PRESSURE: 128 MMHG | RESPIRATION RATE: 18 BRPM | HEART RATE: 85 BPM | HEIGHT: 65 IN | DIASTOLIC BLOOD PRESSURE: 90 MMHG

## 2018-06-25 DIAGNOSIS — J45.41 MODERATE PERSISTENT ASTHMATIC BRONCHITIS WITH ACUTE EXACERBATION: Primary | ICD-10-CM

## 2018-06-25 DIAGNOSIS — J30.2 SEASONAL ALLERGIC RHINITIS, UNSPECIFIED TRIGGER: ICD-10-CM

## 2018-06-25 DIAGNOSIS — R05.9 COUGH: ICD-10-CM

## 2018-06-25 DIAGNOSIS — M79.10 MYALGIA: ICD-10-CM

## 2018-06-25 PROBLEM — E66.01 OBESITY, MORBID (HCC): Status: ACTIVE | Noted: 2018-06-25

## 2018-06-25 RX ORDER — PREDNISONE 10 MG/1
10 TABLET ORAL 2 TIMES DAILY
Qty: 10 TAB | Refills: 0 | Status: SHIPPED | OUTPATIENT
Start: 2018-06-25 | End: 2019-02-21

## 2018-06-25 RX ORDER — LORATADINE 10 MG/1
TABLET ORAL
Qty: 30 TAB | Refills: 1 | Status: SHIPPED | OUTPATIENT
Start: 2018-06-25 | End: 2018-10-17 | Stop reason: SDUPTHER

## 2018-06-25 RX ORDER — ALBUTEROL SULFATE 90 UG/1
2 AEROSOL, METERED RESPIRATORY (INHALATION)
Qty: 1 INHALER | Refills: 0 | Status: SHIPPED | OUTPATIENT
Start: 2018-06-25 | End: 2019-03-05 | Stop reason: SDUPTHER

## 2018-06-25 RX ORDER — IBUPROFEN 800 MG/1
800 TABLET ORAL
Qty: 90 TAB | Refills: 0 | Status: SHIPPED | OUTPATIENT
Start: 2018-06-25 | End: 2019-03-06 | Stop reason: SDUPTHER

## 2018-06-25 NOTE — LETTER
6/25/2018 4:59 PM 
 
Ms. Whaley Friday [address] [place] [address] To Whom It May Concern: 
 
Jovana Friday is currently under the care of [address] [place] [address] Drive. She has a history of asthma. This has several triggers including environments with high humidity. She should avoid any environmental triggers that can cause symptoms. She also does need to take her inhaler medication and a prescription for this has been sent in. If there are questions or concerns please have the patient contact our office. Sincerely, Elen Burnett MD

## 2018-06-25 NOTE — MR AVS SNAPSHOT
[facility] [address] [facility] 
[phone] Patient: Kye Frye MRN: [id number] KNX:[id number] Visit Information Date & Time Provider Department Dept. Phone Encounter #  
 6/25/2018  4:30 PM Estella Mckeon MD [facility] [id number] Follow-up Instructions Return if symptoms worsen or fail to improve. Upcoming Health Maintenance Date Due Influenza Age 5 to Adult 8/1/2018 PAP AKA CERVICAL CYTOLOGY 7/26/2020 DTaP/Tdap/Td series (2 - Td) 4/1/2025 Allergies as of 6/25/2018  Review Complete On: 4/9/2018 By: Guille Rasmussen MD  
  
 Severity Noted Reaction Type Reactions Amoxicillin  12/19/2016    Hives Pork Derived (Porcine)  03/15/2017    Nausea and Vomiting Current Immunizations  Never Reviewed Name Date Influenza Vaccine (Quad) PF 1/6/2017 Not reviewed this visit You Were Diagnosed With   
  
 Codes Comments Cough    -  Primary ICD-10-CM: Z62 ICD-9-CM: 786.2 Allergic rhinitis due to animal hair and dander     ICD-10-CM: J30.81 ICD-9-CM: 477.2 Moderate persistent asthmatic bronchitis with acute exacerbation     ICD-10-CM: J45.41 
ICD-9-CM: 493.92 Seasonal allergic rhinitis, unspecified trigger     ICD-10-CM: J30.2 ICD-9-CM: 477.9 Environmental and seasonal allergies     ICD-10-CM: J30.89 ICD-9-CM: 477.8 Medication refill     ICD-10-CM: Z76.0 ICD-9-CM: V68.1 Vitals BP Pulse Temp Resp Height(growth percentile) Weight(growth percentile) 128/90 (BP 1 Location: Left arm, BP Patient Position: Sitting) 85 99.1 °F (37.3 °C) (Oral) 18 5' 5\" (1.651 m) 243 lb (110.2 kg) LMP SpO2 BMI OB Status Smoking Status 03/07/2018 96% 40.44 kg/m2 Ablation Never Smoker BMI and BSA Data Body Mass Index Body Surface Area 40.44 kg/m 2 2.25 m 2 Preferred Pharmacy Pharmacy Name Phone [id number]- [place] [address] Renato Hernandez [phone] Your Updated Medication List  
  
   
This list is accurate as of 6/25/18  5:07 PM.  Always use your most recent med list.  
  
  
  
  
 albuterol 90 mcg/actuation inhaler Commonly known as:  PROVENTIL HFA, VENTOLIN HFA, PROAIR HFA Take 2 Puffs by inhalation every six (6) hours as needed for Wheezing or Shortness of Breath. clonazePAM 0.5 mg tablet Commonly known as:  KlonoPIN  
  
 fluticasone 50 mcg/actuation nasal spray Commonly known as:  Dyana Haris 2 Sprays by Both Nostrils route daily as needed for Rhinitis or Allergies. ibuprofen 800 mg tablet Commonly known as:  MOTRIN Take 1 Tab by mouth every eight (8) hours as needed for Pain.  
  
 loratadine 10 mg tablet Commonly known as:  CLARITIN  
TAKE 1 TAB BY MOUTH DAILY. OXcarbazepine 300 mg tablet Commonly known as:  TRILEPTAL Take 1 Tab by mouth daily. prazosin 1 mg capsule Commonly known as:  MINIPRESS  
  
 prenatal vit-calcium-iron-fa 27 mg iron- 1 mg Tab Commonly known as:  PREPLUS Take 1 Tab by mouth daily. QUEtiapine  mg sr tablet Commonly known as:  SEROquel XR Take 1 Tab by mouth nightly. tiotropium 18 mcg inhalation capsule Commonly known as:  [address] [place] [address] Drive Take 1 Cap by inhalation daily. Prescriptions Sent to Pharmacy Refills  
 loratadine (CLARITIN) 10 mg tablet 1 Sig: TAKE 1 TAB BY MOUTH DAILY. Class: Normal  
 Pharmacy: [facility]/pharmacy #[id number] - [address] [place] Ph #: [phone]  
 albuterol (PROVENTIL HFA, VENTOLIN HFA, PROAIR HFA) 90 mcg/actuation inhaler 0 Sig: Take 2 Puffs by inhalation every six (6) hours as needed for Wheezing or Shortness of Breath. Class: Normal  
 Pharmacy: [place] [place] [facility] Ph #: [phone]  Route: Inhalation  
 tiotropium (SPIRIVA WITH HANDIHALER) 18 mcg inhalation capsule 0  
 Sig: Take 1 Cap by inhalation daily. Class: Normal  
 Pharmacy: [facility] Ph #: [phone] Route: Inhalation  
 ibuprofen (MOTRIN) 800 mg tablet 0 Sig: Take 1 Tab by mouth every eight (8) hours as needed for Pain. Class: Normal  
 Pharmacy: [facility] Ph #: [phone] Route: Oral  
  
Follow-up Instructions Return if symptoms worsen or fail to improve. To-Do List   
 07/06/2018 1:00 PM  
  Appointment with Justin Erazo at [address] [place] [address] & [facility] SERVICES! Dear Michael Nathan: Thank you for requesting a [employer] account. Our records indicate that you already have an active [employer] account. You can access your account anytime at [email or web address] Did you know that you can access your hospital and ER discharge instructions at any time in [employer]? You can also review all of your test results from your hospital stay or ER visit. Additional Information If you have questions, please visit the Frequently Asked Questions section of the [employer] website at [email or web address] Remember, [employer] is NOT to be used for urgent needs. For medical emergencies, dial 911. Now available from your iPhone and Android! Please provide this summary of care documentation to your next provider. Your primary care clinician is listed as Estella Lu. If you have any questions after today's visit, please call [phone].

## 2018-06-25 NOTE — PROGRESS NOTES
Chief Complaint   Patient presents with    Cough     asthma concern    Allergies     running nose      1. Have you been to the ER, urgent care clinic since your last visit? Hospitalized since your last visit? No    2. Have you seen or consulted any other health care providers outside of the [facility] since your last visit? Include any pap smears or colon screening. No     HPI  Billy Wood comes in for follow-up care. Cough: Patient has been having cough, wheeze and chest tightness. Has been ongoing for some days. This she states is worse in the house when it is humid and the [facility] has been put up to 81°. Also the front has been turned off. The humidity triggers wheezing symptoms. She denies chest pain, hemoptysis, fever or chills. I did encourage her to stay in an environment that is without humidity and is at a temperature that she can tolerate. This would help with her not getting some of the asthma symptoms of cough, chest tightness and wheezing. She has an acute exacerbation of asthma. Will put her on prednisone, albuterol inhaler. I will renew her Spiriva that she has used in the past.  Rhinitis: Patient has nasal congestion, sneezing and runny nose. This has been ongoing for days. She is out of Claritin. I will send in a refill of the same to use for rhinitis. Myalgia: Patient has a history of muscle aches on and off. Uses ibuprofen for this. Would like a prescription sent into the pharmacy. Past Medical History  Past Medical History:   Diagnosis Date    Hypoglycemia     Ill-defined condition     GYN, polycystic ovarian disease.  PCOS (polycystic ovarian syndrome)     Psychiatric disorder     Anxiety       Surgical History  No past surgical history on file. Medications  Current Outpatient Prescriptions   Medication Sig Dispense Refill    loratadine (CLARITIN) 10 mg tablet TAKE 1 TAB BY MOUTH DAILY.  30 Tab 0    fexofenadine (ALLEGRA) 60 mg tablet Take  by mouth.      tiotropium (SPIRIVA WITH HANDIHALER) 18 mcg inhalation capsule Take 1 Cap by inhalation daily. 30 Cap 0    fluticasone (FLONASE) 50 mcg/actuation nasal spray 2 Sprays by Both Nostrils route daily as needed for Rhinitis or Allergies. 1 Bottle 0    prenatal vit-calcium-iron-fa (PREPLUS) 27 mg iron- 1 mg tab Take 1 Tab by mouth daily. 90 Tab 0    ORTHO TRI-CYCLEN, 28, 0.18/0.215/0.25 mg-35 mcg (28) tab TAKE 1 TABLET BY MOUTH DAILY  12    tretinoin (RETIN-A) 0.025 % topical cream       clonazePAM (KLONOPIN) 0.5 mg tablet       prazosin (MINIPRESS) 1 mg capsule       cetirizine (ZYRTEC) 10 mg tablet Take 1 Tab by mouth daily as needed for Allergies. 20 Tab 0    albuterol (PROVENTIL HFA, VENTOLIN HFA, PROAIR HFA) 90 mcg/actuation inhaler Take 2 Puffs by inhalation every six (6) hours as needed for Wheezing or Shortness of Breath. 1 Inhaler 0    inhalational spacing device 1 Each by Does Not Apply route as needed. 1 Each 0    metFORMIN (GLUCOPHAGE) 500 mg tablet Take 500 mg by mouth daily. Indications: POLYCYSTIC OVARIAN SYNDROME, Hyperglycemia      QUEtiapine SR (SEROQUEL XR) 150 mg sr tablet Take 1 Tab by mouth nightly. 90 Tab 0    OXcarbazepine (TRILEPTAL) 300 mg tablet Take 1 Tab by mouth daily. 90 Tab 0    ibuprofen (MOTRIN) 800 mg tablet Take 1 Tab by mouth every eight (8) hours as needed for Pain. 90 Tab 0    neomycin-polymyxin-hydrocortisone, buffered, (PEDIOTIC) 3.5-10,000-1 mg/mL-unit/mL-% otic suspension Administer 3 Drops in left ear four (4) times daily. 5 mL 0    hydrocortisone (HYTONE) 2.5 % topical cream Apply  to affected area two (2) times a day. use thin layer 30 g 0    nystatin-triamcinolone (MYCOLOG) 100,000-0.1 unit/gram-% ointment Apply  to affected area two (2) times a day. 30 g 0    clotrimazole-betamethasone (LOTRISONE) topical cream Apply  to affected area two (2) times a day.  15 g 0    OPTICHAMBER ADULT MASK-LARGE sarah 1 EACH BY DOES NOT APPLY ROUTE AS NEEDED.  0  antipyrine-benzocaine (AURALGAN) otic solution Administer 3-4 Drops in left ear every two (2) hours as needed for Pain.  clindamycin (CLEOCIN T) 1 % external solution          Allergies  Allergies   Allergen Reactions    Amoxicillin Hives    Pork Derived (Porcine) Nausea and Vomiting       Family History  No family history on file. Social History  Social History     Social History    Marital status: SINGLE     Spouse name: N/A    Number of children: N/A    Years of education: N/A     Occupational History    Not on file. Social History Main Topics    Smoking status: Never Smoker    Smokeless tobacco: Never Used    Alcohol use Yes      Comment: Patient sates that she drinks wine.  Drug use: No    Sexual activity: Yes     Partners: Male      Comment: Male Partner had vasectomy.      Other Topics Concern    Not on file     Social History Narrative       Review of Systems  Review of Systems - History obtained from chart review and the patient  General ROS: negative for - chills or fever  Psychological ROS: negative  Respiratory ROS: positive for - cough, shortness of breath and wheezing  negative for - hemoptysis or pleuritic pain  Cardiovascular ROS: no chest pain or dyspnea on exertion  Musculoskeletal ROS: negative  Neurological ROS: negative    Vital Signs  Visit Vitals    /90 (BP 1 Location: Left arm, BP Patient Position: Sitting)    Pulse 85    Temp 99.1 °F (37.3 °C) (Oral)    Resp 18    Ht 5' 5\" (1.651 m)    Wt 243 lb (110.2 kg)    LMP 03/07/2018    SpO2 96%    BMI 40.44 kg/m2         Physical Exam  Physical Examination: General appearance - alert, well appearing, and in no distress, oriented to person, place, and time and acyanotic, in no respiratory distress  Mental status - alert, oriented to person, place, and time, normal mood, behavior, speech, dress, motor activity, and thought processes  Nose - mucosal congestion, clear rhinorrhea and sinuses normal and nontender  Mouth - mucous membranes moist, pharynx normal without lesions  Neck - supple, no significant adenopathy  Lymphatics - no palpable lymphadenopathy  Chest - no tachypnea, retractions or cyanosis, wheezing noted occasional both lung fields  Heart - normal rate and regular rhythm, S1 and S2 normal  Neurological - motor and sensory grossly normal bilaterally  Musculoskeletal - full range of motion without pain  Extremities - intact peripheral pulses    Results  Results for orders placed or performed in visit on 04/06/18   AMB POC RAPID INFLUENZA TEST   Result Value Ref Range    VALID INTERNAL CONTROL POC Yes     QuickVue Influenza test Negative Negative       ASSESSMENT and PLAN    ICD-10-CM ICD-9-CM    1. Moderate persistent asthmatic bronchitis with acute exacerbation J45.41 493.92 albuterol (PROVENTIL HFA, VENTOLIN HFA, PROAIR HFA) 90 mcg/actuation inhaler      predniSONE (DELTASONE) 10 mg tablet   2. Cough R05 786.2 tiotropium (SPIRIVA WITH HANDIHALER) 18 mcg inhalation capsule   3. Seasonal allergic rhinitis, unspecified trigger J30.2 477.9 loratadine (CLARITIN) 10 mg tablet   4. Myalgia M79.1 729.1 ibuprofen (MOTRIN) 800 mg tablet     reviewed diet, exercise and weight control  reviewed medications and side effects in detail    I have discussed the diagnosis with the patient and the intended plan of care as seen in the above orders. The patient has received an after-visit summary and questions were answered concerning future plans. I have discussed medication, side effects, and warnings with the patient in detail. The patient verbalized understanding and is in agreement with the plan of care. The patient will contact the office with any additional concerns.     Lynnette Mckee MD

## 2018-06-27 ENCOUNTER — TELEPHONE (OUTPATIENT)
Dept: FAMILY MEDICINE CLINIC | Age: 38
End: 2018-06-27

## 2018-06-27 ENCOUNTER — DOCUMENTATION ONLY (OUTPATIENT)
Dept: FAMILY MEDICINE CLINIC | Age: 38
End: 2018-06-27

## 2018-06-27 DIAGNOSIS — J32.4 PANSINUSITIS, UNSPECIFIED CHRONICITY: ICD-10-CM

## 2018-06-27 RX ORDER — AZITHROMYCIN 250 MG/1
TABLET, FILM COATED ORAL
Qty: 6 TAB | Refills: 0 | Status: SHIPPED | OUTPATIENT
Start: 2018-06-27 | End: 2019-02-21

## 2018-06-27 NOTE — TELEPHONE ENCOUNTER
Patient states she is currently at [facility] and is requesting for Dr Beni Das to call in something for congestion preferably in liquid form.  Please contact patient when available to do so at given number [phone]

## 2018-06-27 NOTE — PROGRESS NOTES
Patient has developed a productive cough. Has been seen by Dr. Salome Dubin 2 days ago. Says that he always gives her refills of antibiotics but that she went to the pharmacy and it was not present. She says that she called the office for an antibiotic in liquid form - Zithromax. She has not had fevers or focal chest pains. She is comfortably breathing. I asked to place her on a hold when I looked up her chart. She was very appreciative and was placed on hold. After a 4 minute hold, I returned to the caller and she was not present but the line was still connected. I waited for 2 minutes but there was still no answer. I tried calling back with no ring signal.  I thus sent her a [employer] message. I will await a call. She sounded comfortable enough to wait until the morning to discuss this further with her physician. If she calls back I will discuss this further with her and may call in the zithromax or ask Dr. Salome Dubin to handle this in the morning. I have routed this message to him.

## 2018-06-28 ENCOUNTER — HOSPITAL ENCOUNTER (OUTPATIENT)
Dept: PHYSICAL THERAPY | Age: 38
Discharge: HOME | End: 2018-06-28
Payer: MEDICAID

## 2018-06-28 PROCEDURE — 97535 SELF CARE MNGMENT TRAINING: CPT

## 2018-06-28 PROCEDURE — 97161 PT EVAL LOW COMPLEX 20 MIN: CPT

## 2018-06-28 PROCEDURE — 97110 THERAPEUTIC EXERCISES: CPT
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
## 2018-07-06 ENCOUNTER — HOSPITAL ENCOUNTER (OUTPATIENT)
Dept: NUTRITION | Age: 38
Discharge: HOME | End: 2018-07-06
Payer: MEDICAID

## 2018-07-06 PROCEDURE — 97802 MEDICAL NUTRITION INDIV IN: CPT

## 2018-07-06 NOTE — PROGRESS NOTES
[address] [place] [address] [address], [address] [facility], [address] [facility] [address]  Phone: [phone]  Fax: [phone]   Nutrition Assessment - Medical Nutrition Therapy   Outpatient Initial Evaluation         Patient Name: Dilcia León : 1980   Treatment Diagnosis: Morbid obesity   Referral Source: Lavinia Gutierrez MD [facility]): 2018     Gender: female Age: 41 y.o. Ht: 66 in Wt: 237  lb  kg   BMI: 38.3 BMR   Male  BMR Female [id number]   Anthropometrics Assessment: Per BMI, pt is considered obese. Past Medical History includes: Hypoglycemia     Pertinent Medications:   Klonopin, Deltasone, Seroquel     Biochemical Data:   Lab Results   Component Value Date/Time    Hemoglobin A1c 5.2 2016 12:00 AM     Lab Results   Component Value Date/Time    Cholesterol, total 205 (H) 2016 12:00 AM    HDL Cholesterol 57 2016 12:00 AM    LDL, calculated 121 (H) 2016 12:00 AM    VLDL, calculated 27 2016 12:00 AM    Triglyceride 135 2016 12:00 AM     Lab Results   Component Value Date/Time    ALT (SGPT) 19 2016 12:00 AM    AST (SGOT) 21 2016 12:00 AM    Alk. phosphatase 81 2016 12:00 AM    Bilirubin, total 0.4 2016 12:00 AM     Lab Results   Component Value Date/Time    Creatinine 0.74 2016 12:00 AM     Lab Results   Component Value Date/Time    BUN 9 2016 12:00 AM     No results found for: Burke Shock, MCA2, MCA3, MCAU, MCAU2, MCALPOCT     Subjective/Assessment:   40 yo female being seen for weight loss nutrition therapy. Pt very interested in learning how to eat healthy and balance what she's eating to promote weight loss. Pt is engaged and is currently not employed. She is beginning aquatic therapy for her back. Pt only eats one meal each day, often between 3-6 pm. She complains of feeling weak and tired, as a result of not eating adequately.  Pt was engaged, asking many questions and ready to start new healthier habits. She would like to follow up to review her progress and address further questions. Will follow. Current Eating Patterns: Pt will eat one meal daily (buffet or fast-food). She likes to juice and will sometimes add [place] yogurt. Pt eats mostly chicken/fish, limiting red meat and no pork. She is a practicing [employer]. She would like to add more vegetables and replace meat sources with more plant-based sources of protein. Estimate Needs   Calories: 1600  Protein: 135 Carbs: 128 Fat: 50   Kcal/day  g/day  g/day  g/day        percent: 36  34  30               Education & Recommendations provided: Provided education on principles of general healthy eating, including meal timing and how to create balanced meals/snacks. Encouraged Pt to limit/avoid fruit juices and sugar-sweetened beverages. Handouts Provided: []  Carbohydrates  []  Protein  []  Fiber  [x]  Serving Sizes  [x]  Meal and Snack Ideas  []  Food Journals []  Diabetes  []  Cholesterol  []  Sodium  []  Gen Nutr Guidelines  []  SBGM Guidelines  []  Others:   Information Reviewed with: Pt   Readiness to Change Stage: []  Pre-contemplative    []  Contemplative  [x]  Preparation               [x]  Action                  []  Maintenance   Potential Barriers to Learning: []  Decline in memory    []  Language barrier   []  Other:  []  Emotional                  []  Limited mobility  []  Lack of motivation     [] Vision, hearing or cognitive impairment   Expected Compliance: Good     Nutritional Goal - To promote lifestyle changes to result in:    [x]  Weight loss  []  Improved diabetic control  []  Decreased cholesterol levels  []  Decreased blood pressure  []  Weight maintenance []  Preventing any interactions associated with food allergies  []  Adequate weight gain toward goal weight  []  Other:        Patient Goals:  SMART goals 1.   Follow consistent and appropriate meal timing; follow a structured timeline, eating within 2 hours of waking and then every 3-5 hours. Include protein at each meal/snack. 2. Increase fluid intake to 64 fl oz daily.      Dietitian Signature: Franca Owen RDN Date: 7/6/2018   Follow-up: August 1 at 1400 Time: 12:42 PM

## 2018-07-09 ENCOUNTER — APPOINTMENT (OUTPATIENT)
Dept: PHYSICAL THERAPY | Age: 38
End: 2018-07-09
Payer: MEDICAID

## 2018-07-11 ENCOUNTER — HOSPITAL ENCOUNTER (OUTPATIENT)
Dept: PHYSICAL THERAPY | Age: 38
Discharge: HOME | End: 2018-07-11
Payer: MEDICAID

## 2018-07-11 PROCEDURE — 97113 AQUATIC THERAPY/EXERCISES: CPT

## 2018-07-11 NOTE — PROGRESS NOTES
PT DAILY TREATMENT NOTE     Patient Name: Donn Milian  Date:2018  : 1980  [x]  Patient  Verified  Payor: [facility] MEDICAID / Plan: [facility] / Product Type: Managed Care Medicaid /    In time:1:30pm  Out time:2:15pm  Total Treatment Time (min): 45  Visit #: 2 of 10    Treatment Area: Low back pain [M54.5]    SUBJECTIVE  Pain Level (0-10 scale): 6/10  Any medication changes, allergies to medications, adverse drug reactions, diagnosis change, or new procedure performed?: [x] No    [] Yes (see summary sheet for update)  Subjective functional status/changes:   [] No changes reported  Pt stated that she is scheduled to have breast reduction surgery in November and is hopeful that will decrease her back pain. OBJECTIVE      45 min Therapeutic Exercise:  [x] See flow sheet : Aquatic therapy   Rationale: increase ROM and increase strength to improve the patients ability to perform ADLs and functional mobility tasks with improved ease and decreased pain. With   [] TE   [] TA   [] neuro   [] other: Patient Education: [x] Review HEP    [] Progressed/Changed HEP based on:   [] positioning   [] body mechanics   [] transfers   [] heat/ice application    [] other:      Other Objective/Functional Measures: Initiated aquatic exercises today's session. Pain Level (0-10 scale) post treatment: 6/10    ASSESSMENT/Changes in Function: Pt tolerated first aquatic session well with no increase in pain reported during exercises. Following session, pt stated that she would like to jog on the treadmill for at least 30 minutes at next session and that she wanted more \"aerobic exercise\" for her therapy session. Therapist explained in detail the difference between therapeutic exercise versus aerobic/fitness-type of exercise.   Educated pt on goals of PT for decrease in back pain and improved functional ability based on therapeutic interventions verus \"fitness center\" type of exercise regimen. Patient will continue to benefit from skilled PT services to modify and progress therapeutic interventions, address functional mobility deficits, address ROM deficits, address strength deficits, analyze and cue movement patterns, analyze and modify body mechanics/ergonomics and assess and modify postural abnormalities to attain remaining goals. []  See Plan of Care  []  See progress note/recertification  []  See Discharge Summary         Progress towards goals / Updated goals:  Short Term Goals: To be accomplished in 2 weeks:                         1. Patient will report performance of HEP at least 2 times per day to facilitate improved outcomes and improved self management. Not met: Pt reports not being able to do any of the exercises from her HEP due to difficulty getting up from the floor (advised pt to perform ex on her bed versus the floor) and due to the exercises increasing her back pain. (7/11/18). 2. Pt will demonstrate independence with aquatic therapy interventions to facilitate self management. Long Term Goals: To be accomplished in 5 weeks:                         1. Patient will report FOTO score of 57 or better to indicate significant improvement in functional status. 2. Pt will demonstrate ability to maintain posterior pelvic tilt with core stability interventions to improve ant abdominal chain activation and reduce lordotic postural contribution to mechanical back pain. 3. Pt will demonstrate ability to perform 30 minutes of exercise void of pain exacerbation to improve ease of recreational exercise. 4. Pt will report abolishment of pain with sit to stands from bed or car to improve QOL.                                PLAN  []  Upgrade activities as tolerated     [x]  Continue plan of care  []  Update interventions per flow sheet       []  Discharge due to:_  []  Other:_      Elda Gregg Vikas Hoyos, PT 7/11/2018  2:06 PM    Future Appointments  Date Time Provider Boris Estrada   8/1/2018 2:00 PM [address] [facility]

## 2018-07-27 ENCOUNTER — HOSPITAL ENCOUNTER (OUTPATIENT)
Dept: PHYSICAL THERAPY | Age: 38
End: 2018-07-27
Payer: MEDICAID

## 2018-07-30 ENCOUNTER — APPOINTMENT (OUTPATIENT)
Dept: PHYSICAL THERAPY | Age: 38
End: 2018-07-30
Payer: MEDICAID

## 2018-08-18 DIAGNOSIS — R05.9 COUGH: ICD-10-CM

## 2018-08-20 RX ORDER — TIOTROPIUM BROMIDE 18 UG/1
CAPSULE ORAL; RESPIRATORY (INHALATION)
Qty: 30 CAP | Refills: 2 | Status: SHIPPED | OUTPATIENT
Start: 2018-08-20 | End: 2019-08-20

## 2018-10-04 NOTE — PROGRESS NOTES
[facility]  [address] [facility] [address]  [place], [address]  [phone] fax    Physical Therapy Discharge Summary  Patient name: Kimberly Contreras Start of Care: 2018   Referral source: KEEGAN Wilde : 1980   Medical/Treatment Diagnosis: Low back pain [M54.5] Onset Date:years     Prior Hospitalization: see medical history Provider#: [id number]   Medications: Verified on Patient Summary List     Comorbidities: asthma, knee pain intermittently, otherwise unremarkable   Prior Level of Function: back pain for many years, intermittently exacerbated, able to perform yoga, cardio for 30-40 minutes void of immediate provocation per pt report, increased pain sometimes with household cleaning such as vacuuming and with prolonged sitting, other times pain is random onset  Visits from Start of Care: 2    Missed Visits: 0  Reporting Period : 2018 to 2018    Summary of Care:  Short Term Goals: To be accomplished in 2 weeks:                         1. Patient will report performance of HEP at least 2 times per day to facilitate improved outcomes and improved self management. Not met: Pt reports not being able to do any of the exercises from her HEP due to difficulty getting up from the floor (advised pt to perform ex on her bed versus the floor) and due to the exercises increasing her back pain. (18).                       2. Pt will demonstrate independence with aquatic therapy interventions to facilitate self management. Long Term Goals: To be accomplished in 5 weeks:                         2. Patient will report FOTO score of 57 or better to indicate significant improvement in functional status.                          7. Pt will demonstrate ability to maintain posterior pelvic tilt with core stability interventions to improve ant abdominal chain activation and reduce lordotic postural contribution to mechanical back pain.                         3. Pt will demonstrate ability to perform 30 minutes of exercise void of pain exacerbation to improve ease of recreational exercise.                         4. Pt will report abolishment of pain with sit to stands from bed or car to improve QOL. ASSESSMENT/RECOMMENDATIONS: Unable to further assess progress towards goals at this time due to non-compliance/lack of attendance as pt has self DC transferring to another organization. DC at this time with no further instructions to the patient.   Thank you for this referral.    [x]Discontinue therapy: []Patient has reached or is progressing toward set goals      []Patient is non-compliant or has abdicated      []Due to lack of appreciable progress towards set goals    Roger Juarez, PT, DPT, ATC 10/4/2018 1:52 PM

## 2018-10-17 ENCOUNTER — OFFICE VISIT (OUTPATIENT)
Dept: FAMILY MEDICINE CLINIC | Age: 38
End: 2018-10-17

## 2018-10-17 VITALS
RESPIRATION RATE: 18 BRPM | HEIGHT: 65 IN | HEART RATE: 81 BPM | BODY MASS INDEX: 39.32 KG/M2 | TEMPERATURE: 98.1 F | SYSTOLIC BLOOD PRESSURE: 97 MMHG | OXYGEN SATURATION: 98 % | WEIGHT: 236 LBS | DIASTOLIC BLOOD PRESSURE: 66 MMHG

## 2018-10-17 DIAGNOSIS — F31.9 BIPOLAR 1 DISORDER (HCC): ICD-10-CM

## 2018-10-17 DIAGNOSIS — J30.2 SEASONAL ALLERGIC RHINITIS, UNSPECIFIED TRIGGER: ICD-10-CM

## 2018-10-17 DIAGNOSIS — E66.9 OBESITY (BMI 30-39.9): ICD-10-CM

## 2018-10-17 DIAGNOSIS — E28.2 PCOS (POLYCYSTIC OVARIAN SYNDROME): ICD-10-CM

## 2018-10-17 DIAGNOSIS — H10.9 CONJUNCTIVITIS OF BOTH EYES, UNSPECIFIED CONJUNCTIVITIS TYPE: Primary | ICD-10-CM

## 2018-10-17 DIAGNOSIS — Z23 ENCOUNTER FOR IMMUNIZATION: ICD-10-CM

## 2018-10-17 RX ORDER — LORATADINE 10 MG/1
TABLET ORAL
Qty: 30 TAB | Refills: 1 | Status: SHIPPED | OUTPATIENT
Start: 2018-10-17 | End: 2019-03-05 | Stop reason: SDUPTHER

## 2018-10-17 RX ORDER — POLYMYXIN B SULFATE AND TRIMETHOPRIM 1; 10000 MG/ML; [USP'U]/ML
1 SOLUTION OPHTHALMIC EVERY 6 HOURS
Qty: 1 BOTTLE | Refills: 0 | Status: SHIPPED | OUTPATIENT
Start: 2018-10-17 | End: 2018-10-27

## 2018-10-17 RX ORDER — METFORMIN HYDROCHLORIDE 750 MG/1
750 TABLET, EXTENDED RELEASE ORAL DAILY
Qty: 30 TAB | Refills: 2 | Status: SHIPPED | OUTPATIENT
Start: 2018-10-17 | End: 2019-08-20

## 2018-10-17 RX ORDER — METFORMIN HYDROCHLORIDE 850 MG/1
TABLET ORAL 2 TIMES DAILY WITH MEALS
COMMUNITY
End: 2018-10-17

## 2018-10-17 NOTE — PROGRESS NOTES
Patient was administered Seasonal flu vaccine via right deltoid and PPSV23 vaccine via left deltoid without difficulty. Patient waited 15 minutes and no side effects observed. Patient educated on side effects and patient verbalized understanding. No acute distress noted or voiced.

## 2018-10-17 NOTE — PROGRESS NOTES
Subjective:  
Honey Su is a 41 y.o. female who present complaining of flu-like symptoms: fevers, chills, myalgias, congestion, sore throat and cough for *** days. She {has/denies:789694::\"denies\"} dyspnea or wheezing. Smoking status: {Smoker?:11024::\"non-smoker\"}. Flu vaccine status: {Flu vaccine status:20345}. Relevant PMH: {Uri pmh:67953::\"No pertinent additional PMH\"}. Review of Systems 
{ros - complete:95294} {Choose one or more SmartLinks; press DELETE if none desired:2660048} Objective:  
 
Visit Vitals BP 97/66 (BP 1 Location: Right arm, BP Patient Position: Sitting) Pulse 81 Temp 98.1 °F (36.7 °C) (Oral) Resp 18 Ht 5' 5\" (1.651 m) Wt 236 lb (107 kg) LMP 09/06/2018 (Approximate) SpO2 98% BMI 39.27 kg/m² Appears moderately ill but not toxic; temperature as noted in vitals. Ears normal.  
Throat and pharynx normal.   
Neck supple. No adenopathy in the neck. Sinuses non tender. The chest is clear. Assessment/Plan:  
Influenza very likely from clinical presentation and seasonal pattern Considerations for specific influenza anti-viral therapy: {Influenza Tx:89858} Symptomatic therapy suggested: {symptomatic tx:633453::\"call prn if symptoms persist or worsen\"}. Call or return to clinic prn if these symptoms worsen or fail to improve as anticipated. {Assessment and Plan:06360}.

## 2018-10-17 NOTE — PROGRESS NOTES
Chief Complaint Patient presents with  Eyelid Inflammation  
  left eye for past 3 days  Medication Refill  
  request refills for Loratadine 10mg and Preplus Vitamin 1. Have you been to the ER, urgent care clinic since your last visit? Hospitalized since your last visit? No 
 
2. Have you seen or consulted any other health care providers outside of the [facility] since your last visit? Include any pap smears or colon screening. No  
 
HPI Varinder Nino comes in for acute care. Patient has pain and discharge and redness left eye. This has been ongoing for more than a day. She wears contacts but today has not put this on given the symptoms. This is conjunctivitis. I will treat with Polytrim eyedrops. Patient also has history of allergies with sneezing and runny nose. She uses loratadine and this helps with the symptoms. She is out of the medication. She denies fever or chills. Denies cough, wheeze or shortness of breath. Prescription will be sent into the pharmacy. Patient has a history of PCO S. Takes metformin for this. Would like a refill. Patient has obesity. BMI is 39.27. We discussed lifestyle and dietary modification. She will do this in an effort to bring down her weight. She is also on metformin that has helped with weight loss. Patient has a history of bipolar disorder. She is followed up by behavioral health specialist.  She is on Seroquel, Trileptal and Minipress. Also takes clonazepam.  She will continue with treatment as recommended by her behavioral health specialist.  Patient has COPD. She is on Spiriva and albuterol inhalers. Currently denies wheezing or shortness of breath. Denies nocturnal cough. Past Medical History Past Medical History:  
Diagnosis Date  Hypoglycemia  Ill-defined condition GYN, polycystic ovarian disease.  PCOS (polycystic ovarian syndrome)  Psychiatric disorder Anxiety Surgical History History reviewed. No pertinent surgical history. Medications Current Outpatient Medications Medication Sig Dispense Refill  loratadine (CLARITIN) 10 mg tablet TAKE 1 TAB BY MOUTH DAILY. 30 Tab 1  
 trimethoprim-polymyxin b (POLYTRIM) ophthalmic solution Administer 1 Drop to left eye every six (6) hours for 10 days. 1 Bottle 0  
 metFORMIN ER (GLUCOPHAGE XR) 750 mg tablet Take 1 Tab by mouth daily. 30 Tab 2  
 SPIRIVA WITH HANDIHALER 18 mcg inhalation capsule INHALE CONTENTS OF 1 CAPSULE DAILY. 30 Cap 2  
 albuterol (PROVENTIL HFA, VENTOLIN HFA, PROAIR HFA) 90 mcg/actuation inhaler Take 2 Puffs by inhalation every six (6) hours as needed for Wheezing or Shortness of Breath. 1 Inhaler 0  
 ibuprofen (MOTRIN) 800 mg tablet Take 1 Tab by mouth every eight (8) hours as needed for Pain. 90 Tab 0  
 fluticasone (FLONASE) 50 mcg/actuation nasal spray 2 Sprays by Both Nostrils route daily as needed for Rhinitis or Allergies. 1 Bottle 0  
 prenatal vit-calcium-iron-fa (PREPLUS) 27 mg iron- 1 mg tab Take 1 Tab by mouth daily. 90 Tab 0  clonazePAM (KLONOPIN) 0.5 mg tablet  prazosin (MINIPRESS) 1 mg capsule  QUEtiapine SR (SEROQUEL XR) 150 mg sr tablet Take 1 Tab by mouth nightly. 90 Tab 0  
 OXcarbazepine (TRILEPTAL) 300 mg tablet Take 1 Tab by mouth daily. 90 Tab 0  
 azithromycin (ZITHROMAX) 250 mg tablet TAKE 2 TABLETS TODAY, THEN TAKE 1 TABLET DAILY. 6 Tab 0  predniSONE (DELTASONE) 10 mg tablet Take 1 Tab by mouth two (2) times a day. 10 Tab 0 Allergies Allergies Allergen Reactions  Amoxicillin Hives  Pork Derived (Porcine) Nausea and Vomiting Family History History reviewed. No pertinent family history. Social History Social History Socioeconomic History  Marital status: SINGLE Spouse name: Not on file  Number of children: Not on file  Years of education: Not on file  Highest education level: Not on file Social Needs  Financial resource strain: Not on file  Food insecurity - worry: Not on file  Food insecurity - inability: Not on file  Transportation needs - medical: Not on file  Transportation needs - non-medical: Not on file Occupational History  Not on file Tobacco Use  Smoking status: Never Smoker  Smokeless tobacco: Never Used Substance and Sexual Activity  Alcohol use: Yes Comment: Patient sates that she drinks wine.  Drug use: No  
 Sexual activity: Yes  
  Partners: Male Comment: Male Partner had vasectomy. Other Topics Concern  Not on file Social History Narrative  Not on file Review of Systems Review of Systems -review of all systems is negative except as noted above in the HPI. Vital Signs Visit Vitals BP 97/66 (BP 1 Location: Right arm, BP Patient Position: Sitting) Pulse 81 Temp 98.1 °F (36.7 °C) (Oral) Resp 18 Ht 5' 5\" (1.651 m) Wt 236 lb (107 kg) LMP 09/06/2018 (Approximate) SpO2 98% BMI 39.27 kg/m² Physical Exam 
Physical Examination: General appearance - alert, well appearing, and in no distress, oriented to person, place, and time, overweight and acyanotic, in no respiratory distress Mental status - alert, oriented to person, place, and time, affect appropriate to mood Eyes - conjunctivitis noted left eye Ears - hearing grossly normal bilaterally Nose - mucosal congestion Mouth - mucous membranes moist, pharynx normal without lesions Neck - supple, no significant adenopathy Lymphatics - no palpable lymphadenopathy Chest - clear to auscultation, no wheezes, rales or rhonchi, symmetric air entry Heart - normal rate, regular rhythm, normal S1, S2, no murmurs, rubs, clicks or gallops Neurological - motor and sensory grossly normal bilaterally Musculoskeletal - full range of motion without pain Extremities - intact peripheral pulses Results Results for orders placed or performed in visit on 04/06/18 AMB POC RAPID INFLUENZA TEST Result Value Ref Range VALID INTERNAL CONTROL POC Yes QuickVue Influenza test Negative Negative ASSESSMENT and PLAN 
  ICD-10-CM ICD-9-CM 1. Conjunctivitis of both eyes, unspecified conjunctivitis type H10.9 372.30 trimethoprim-polymyxin b (POLYTRIM) ophthalmic solution 2. Seasonal allergic rhinitis, unspecified trigger J30.2 477.9 loratadine (CLARITIN) 10 mg tablet 3. PCOS (polycystic ovarian syndrome) E28.2 256.4 metFORMIN ER (GLUCOPHAGE XR) 750 mg tablet 4. Bipolar 1 disorder (HCC) F31.9 296.7 5. Encounter for immunization Z23 V03.89 INFLUENZA VIRUS VAC QUAD,SPLIT,PRESV FREE SYRINGE IM  
   NC IMMUNIZ ADMIN,1 SINGLE/COMB VAC/TOXOID PNEUMOCOCCAL POLYSACCHARIDE VACCINE, 23-VALENT, ADULT OR IMMUNOSUPPRESSED PT DOSE,  
   NC IMMUNIZ,ADMIN,EACH ADDL  
6. Obesity (BMI 30-39. 9) E66.9 278.00 Discussed the patient's BMI with her. The BMI follow up plan is as follows:  
 
dietary management education, guidance, and counseling 
encourage exercise 
monitor weight 
reviewed diet, exercise and weight control 
reviewed medications and side effects in detail I have discussed the diagnosis with the patient and the intended plan of care as seen in the above orders. The patient has received an after-visit summary and questions were answered concerning future plans. I have discussed medication, side effects, and warnings with the patient in detail. The patient verbalized understanding and is in agreement with the plan of care. The patient will contact the office with any additional concerns.  
 
Samir Malik MD

## 2018-10-17 NOTE — PATIENT INSTRUCTIONS
Body Mass Index: Care Instructions Your Care Instructions Body mass index (BMI) can help you see if your weight is raising your risk for health problems. It uses a formula to compare how much you weigh with how tall you are. · A BMI lower than 18.5 is considered underweight. · A BMI between 18.5 and 24.9 is considered healthy. · A BMI between 25 and 29.9 is considered overweight. A BMI of 30 or higher is considered obese. If your BMI is in the normal range, it means that you have a lower risk for weight-related health problems. If your BMI is in the overweight or obese range, you may be at increased risk for weight-related health problems, such as high blood pressure, heart disease, stroke, arthritis or joint pain, and diabetes. If your BMI is in the underweight range, you may be at increased risk for health problems such as fatigue, lower protection (immunity) against illness, muscle loss, bone loss, hair loss, and hormone problems. BMI is just one measure of your risk for weight-related health problems. You may be at higher risk for health problems if you are not active, you eat an unhealthy diet, or you drink too much alcohol or use tobacco products. Follow-up care is a key part of your treatment and safety. Be sure to make and go to all appointments, and call your doctor if you are having problems. It's also a good idea to know your test results and keep a list of the medicines you take. How can you care for yourself at home? · Practice healthy eating habits. This includes eating plenty of fruits, vegetables, whole grains, lean protein, and low-fat dairy. · If your doctor recommends it, get more exercise. Walking is a good choice. Bit by bit, increase the amount you walk every day. Try for at least 30 minutes on most days of the week. · Do not smoke. Smoking can increase your risk for health problems.  If you need help quitting, talk to your doctor about stop-smoking programs and medicines. These can increase your chances of quitting for good. · Limit alcohol to 2 drinks a day for men and 1 drink a day for women. Too much alcohol can cause health problems. If you have a BMI higher than 25 · Your doctor may do other tests to check your risk for weight-related health problems. This may include measuring the distance around your waist. A waist measurement of more than 40 inches in men or 35 inches in women can increase the risk of weight-related health problems. · Talk with your doctor about steps you can take to stay healthy or improve your health. You may need to make lifestyle changes to lose weight and stay healthy, such as changing your diet and getting regular exercise. If you have a BMI lower than 18.5 · Your doctor may do other tests to check your risk for health problems. · Talk with your doctor about steps you can take to stay healthy or improve your health. You may need to make lifestyle changes to gain or maintain weight and stay healthy, such as getting more healthy foods in your diet and doing exercises to build muscle. Where can you learn more? Go to http://nenita-anjali.info/. Enter S176 in the search box to learn more about \"Body Mass Index: Care Instructions. \" Current as of: October 13, 2016 Content Version: 11.4 © 6868-2441 [employer]. Care instructions adapted under license by [employer] (which disclaims liability or warranty for this information). If you have questions about a medical condition or this instruction, always ask your healthcare professional. [address] any warranty or liability for your use of this information.

## 2018-10-22 RX ORDER — HYDROCORTISONE 25 MG/G
CREAM TOPICAL 2 TIMES DAILY
Qty: 30 G | Refills: 0 | Status: SHIPPED | OUTPATIENT
Start: 2018-10-22 | End: 2020-08-10

## 2018-10-26 ENCOUNTER — TELEPHONE (OUTPATIENT)
Dept: FAMILY MEDICINE CLINIC | Age: 38
End: 2018-10-26

## 2018-10-26 NOTE — TELEPHONE ENCOUNTER
Patient is Dr Ana Bazan patient but is asking if she can get something for a UTI , she is having pain. She usually use Flagyl. Patient understands that Dr Viktor Urena is not in the office today but is asking if the nurse can put the prescription in for her.  Please contact patient to let her know if it can be done when available

## 2018-10-26 NOTE — TELEPHONE ENCOUNTER
Return call made and 2 identifiers used. Pt made aware that she needed to be evaluated and an appointment was needed. Same day appointment offered. Pt not able to come in this afternoon due to the weather. Advised pt to try a [facility] that is close to her residence. Pt states she will. Stable

## 2018-11-29 ENCOUNTER — TELEPHONE (OUTPATIENT)
Dept: FAMILY MEDICINE CLINIC | Age: 38
End: 2018-11-29

## 2018-11-29 NOTE — TELEPHONE ENCOUNTER
Gina PALOMINO, from [facility], [facility] ([facility]), called in regards to prior authorization in regards to breast reduction. She is advising the PCP the contact the patient to assist in cooridnation services in specialist. She says this an escalated issue and perhaps the PCP can assist with paperwork. Discrepancy with authorization.     Fax [phone] for submission    Carey Pal  [phone]

## 2018-11-30 NOTE — TELEPHONE ENCOUNTER
Spoke with patient and verified name and . Advised of previous message. Scheduled appt for 18 at 2:45pm to discuss this matter with Dr. Terese Puri.

## 2018-12-04 ENCOUNTER — OFFICE VISIT (OUTPATIENT)
Dept: FAMILY MEDICINE CLINIC | Age: 38
End: 2018-12-04

## 2018-12-04 VITALS
HEART RATE: 79 BPM | DIASTOLIC BLOOD PRESSURE: 91 MMHG | WEIGHT: 239 LBS | SYSTOLIC BLOOD PRESSURE: 114 MMHG | BODY MASS INDEX: 39.82 KG/M2 | OXYGEN SATURATION: 98 % | TEMPERATURE: 96.6 F | HEIGHT: 65 IN | RESPIRATION RATE: 18 BRPM

## 2018-12-04 DIAGNOSIS — H10.9 CONJUNCTIVITIS OF LEFT EYE, UNSPECIFIED CONJUNCTIVITIS TYPE: ICD-10-CM

## 2018-12-04 DIAGNOSIS — N62 GYNECOMASTIA: ICD-10-CM

## 2018-12-04 DIAGNOSIS — M54.6 CHRONIC THORACIC BACK PAIN, UNSPECIFIED BACK PAIN LATERALITY: Primary | ICD-10-CM

## 2018-12-04 DIAGNOSIS — G89.29 CHRONIC THORACIC BACK PAIN, UNSPECIFIED BACK PAIN LATERALITY: Primary | ICD-10-CM

## 2018-12-04 RX ORDER — POLYMYXIN B SULFATE AND TRIMETHOPRIM 1; 10000 MG/ML; [USP'U]/ML
1 SOLUTION OPHTHALMIC EVERY 4 HOURS
Qty: 1 BOTTLE | Refills: 0 | Status: SHIPPED | OUTPATIENT
Start: 2018-12-04 | End: 2018-12-14

## 2018-12-04 NOTE — PROGRESS NOTES
Chief Complaint   Patient presents with    Follow-up     Patient is here to discuss upcoming breast reduction surgery.  Medication Refill     1. Have you been to the ER, urgent care clinic since your last visit? Hospitalized since your last visit? No    2. Have you seen or consulted any other health care providers outside of the [address] [place] [address] since your last visit? Include any pap smears or colon screening. No     HPI  Arsalan Rumps comes in today to discuss upcoming planned procedure. Patient has a history of back pain. This has been chronic for her. I did initially see her and put her on ibuprofen for this. Later on was referred to the orthopedist.  At that time she had radiological studies and had a diagnosis of low back pain, lumbago and lumbar radiculopathy. She was referred for therapy. From records available to me this included  aqua therapy. Subsequently she has also been seen by a plastic surgeon Dr. Kirby Naik. This is due to upper back pain and strain. I do not have the records from this physician but  the patient reports that it has been determined that the reason for the back strain is due to her enlarged breasts. She has been prescribed a back brace and this has been given after approval from her insurance company. She states that she is scheduled to have breast reduction surgery as an outpatient. I do not have any records to indicate this. Patient has been seen by a specialist and this has been the recommendation. Her plastic surgeon has already sent in prior authorizations for the needed approvals to her insurance company. I would need to see the clinical notes by her plastic surgeon. Otherwise I do not have any recommendations to make on this matter at the moment. Patient has conjunctivitis. She has redness in her left eye. She would like some medication for this. I will give Polytrim eyedrops.   She does need to take out her contacts for now until the conjunctivitis resolves. Past Medical History  Past Medical History:   Diagnosis Date    Hypoglycemia     Ill-defined condition     GYN, polycystic ovarian disease.  PCOS (polycystic ovarian syndrome)     Psychiatric disorder     Anxiety       Surgical History  History reviewed. No pertinent surgical history. Medications  Current Outpatient Medications   Medication Sig Dispense Refill    hydrocortisone (HYTONE) 2.5 % topical cream Apply  to affected area two (2) times a day. use thin layer 30 g 0    loratadine (CLARITIN) 10 mg tablet TAKE 1 TAB BY MOUTH DAILY. 30 Tab 1    metFORMIN ER (GLUCOPHAGE XR) 750 mg tablet Take 1 Tab by mouth daily. 30 Tab 2    prenatal vit-calcium-iron-fa (PREPLUS) 27 mg iron- 1 mg tab Take 1 Tab by mouth daily. 90 Tab 0    SPIRIVA WITH HANDIHALER 18 mcg inhalation capsule INHALE CONTENTS OF 1 CAPSULE DAILY. 30 Cap 2    albuterol (PROVENTIL HFA, VENTOLIN HFA, PROAIR HFA) 90 mcg/actuation inhaler Take 2 Puffs by inhalation every six (6) hours as needed for Wheezing or Shortness of Breath. 1 Inhaler 0    ibuprofen (MOTRIN) 800 mg tablet Take 1 Tab by mouth every eight (8) hours as needed for Pain. 90 Tab 0    predniSONE (DELTASONE) 10 mg tablet Take 1 Tab by mouth two (2) times a day. 10 Tab 0    fluticasone (FLONASE) 50 mcg/actuation nasal spray 2 Sprays by Both Nostrils route daily as needed for Rhinitis or Allergies. 1 Bottle 0    clonazePAM (KLONOPIN) 0.5 mg tablet       prazosin (MINIPRESS) 1 mg capsule       QUEtiapine SR (SEROQUEL XR) 150 mg sr tablet Take 1 Tab by mouth nightly. 90 Tab 0    OXcarbazepine (TRILEPTAL) 300 mg tablet Take 1 Tab by mouth daily. 90 Tab 0    azithromycin (ZITHROMAX) 250 mg tablet TAKE 2 TABLETS TODAY, THEN TAKE 1 TABLET DAILY. 6 Tab 0       Allergies  Allergies   Allergen Reactions    Amoxicillin Hives    Pork Derived (Porcine) Nausea and Vomiting       Family History  History reviewed. No pertinent family history.     Social History  Social History     Socioeconomic History    Marital status: SINGLE     Spouse name: Not on file    Number of children: Not on file    Years of education: Not on file    Highest education level: Not on file   Social Needs    Financial resource strain: Not on file    Food insecurity - worry: Not on file    Food insecurity - inability: Not on file    Transportation needs - medical: Not on file   [employer] needs - non-medical: Not on file   Occupational History    Not on file   Tobacco Use    Smoking status: Never Smoker    Smokeless tobacco: Never Used   Substance and Sexual Activity    Alcohol use: Yes     Comment: Patient sates that she drinks wine.  Drug use: No    Sexual activity: Yes     Partners: Male     Comment: Male Partner had vasectomy. Other Topics Concern    Not on file   Social History Narrative    Not on file       Review of Systems  Review of Systems -negative except as noted above in the HPI. Vital Signs  Visit Vitals  BP (!) 114/91 (BP 1 Location: Left arm, BP Patient Position: Sitting)   Pulse 79   Temp 96.6 °F (35.9 °C) (Oral)   Resp 18   Ht 5' 5\" (1.651 m)   Wt 239 lb (108.4 kg)   SpO2 98%   BMI 39.77 kg/m²         Physical Exam  Physical Examination: General appearance - alert, well appearing, and in no distress, oriented to person, place, and time, overweight and acyanotic, in no respiratory distress  Mental status - alert, oriented to person, place, and time  Eyes - conjunctivitis noted Left eye  Heart - normal rate and regular rhythm  Back exam - limited range of motion  Neurological - alert, oriented, normal speech, no focal findings or movement disorder noted    Results  Results for orders placed or performed in visit on 04/06/18   AMB POC RAPID INFLUENZA TEST   Result Value Ref Range    VALID INTERNAL CONTROL POC Yes     QuickVue Influenza test Negative Negative       ASSESSMENT and PLAN    ICD-10-CM ICD-9-CM    1.  Chronic thoracic back pain, unspecified back pain laterality M54.6 724.1     G89.29 338.29    2. Conjunctivitis of left eye, unspecified conjunctivitis type H10.9 372.30    3. Gynecomastia N62 611.1      reviewed diet, exercise and weight control  reviewed medications and side effects in detail    I have discussed the diagnosis with the patient and the intended plan of care as seen in the above orders. The patient has received an after-visit summary and questions were answered concerning future plans. I have discussed medication, side effects, and warnings with the patient in detail. The patient verbalized understanding and is in agreement with the plan of care. The patient will contact the office with any additional concerns.     Saray Caldwell MD

## 2018-12-29 ENCOUNTER — TELEPHONE (OUTPATIENT)
Dept: FAMILY MEDICINE CLINIC | Age: 38
End: 2018-12-29

## 2018-12-29 RX ORDER — OFLOXACIN 3 MG/ML
SOLUTION/ DROPS OPHTHALMIC
Qty: 10 ML | Refills: 0 | Status: SHIPPED | OUTPATIENT
Start: 2018-12-29 | End: 2019-08-20 | Stop reason: SDUPTHER

## 2018-12-29 NOTE — TELEPHONE ENCOUNTER
Received call from patient via answering service. Patient states that she was seen at [facility] about two weeks ago for conjunctivitis. She was given ofloxacin drops since she is a contact lens-wearer. She reports the conjunctivitis cleared up quickly, but she then put the same contact lenses back in her eyes earlier this week and symptoms have recurred. She started using the drops again with good relief, but does not have enough for a full 7 day course. Will send new rx for drops to patient's pharmacy. She was advised to complete a full 7 day course of treatment and DISCARD her contact lenses. Do not apply new lenses until 7 day course of treatment has been completed. Follow up with PCP or eye doctor if symptoms persist or worsen. Patient expressed understanding and agreed to this plan.

## 2019-01-15 ENCOUNTER — TELEPHONE (OUTPATIENT)
Dept: FAMILY MEDICINE CLINIC | Age: 39
End: 2019-01-15

## 2019-01-15 DIAGNOSIS — J32.4 PANSINUSITIS, UNSPECIFIED CHRONICITY: ICD-10-CM

## 2019-01-15 RX ORDER — AZITHROMYCIN 250 MG/1
TABLET, FILM COATED ORAL
Qty: 6 TAB | Refills: 0 | Status: SHIPPED | OUTPATIENT
Start: 2019-01-15 | End: 2019-01-20

## 2019-01-15 NOTE — TELEPHONE ENCOUNTER
Patient was called and verified by Name and . Patient was informed that Fax had been received and placed on MD desk. Patient verbalized understanding.

## 2019-01-17 ENCOUNTER — TELEPHONE (OUTPATIENT)
Dept: FAMILY MEDICINE CLINIC | Age: 39
End: 2019-01-17

## 2019-01-17 NOTE — TELEPHONE ENCOUNTER
Pt started taking the medicine that was prescribed to her as well as the flonase and is experiencing little particles of blood coming out when blowing her nose, and wants to know if that's normal or if she should just go ahead and schedule an appt. She also asked if she should be prescribed a different lighter medicine now that the mucus is starting to loosen up.

## 2019-01-17 NOTE — TELEPHONE ENCOUNTER
Patient asked to have a call back for a question regarding her while she is on her treatment, with her respiratory allergy

## 2019-01-17 NOTE — TELEPHONE ENCOUNTER
Patient called and was verified by Name and . Patient was educated on current symptoms and understanding verbalized.

## 2019-02-01 ENCOUNTER — TELEPHONE (OUTPATIENT)
Dept: FAMILY MEDICINE CLINIC | Age: 39
End: 2019-02-01

## 2019-02-01 NOTE — TELEPHONE ENCOUNTER
Patient called and asked for some Neomycin polynyxin B sulfate , hydrocortisone solution optic solution for her ear. She said she's been prescribed it before but I could not find it.     Patient confirms pharmacy: [facility]  Patient aware of 24-48 hr turnaround

## 2019-02-01 NOTE — TELEPHONE ENCOUNTER
Patient was called and verified by Name and . Patient was informed that Dr Ed Rodrigez was out of the office on ,however, the message has been sent to him for review of requested medication. Patient verbalized understanding.

## 2019-02-20 RX ORDER — OFLOXACIN 3 MG/ML
5 SOLUTION AURICULAR (OTIC) DAILY
Qty: 10 ML | Refills: 0 | Status: SHIPPED | OUTPATIENT
Start: 2019-02-20 | End: 2019-02-21

## 2019-02-20 NOTE — TELEPHONE ENCOUNTER
Please let patient know this particular combination has steroid. I would hesitate to prescribe that for her ear due to the steroid. I will instead give ofloxacin eardrops. She can try those.   James Guerrero MD

## 2019-02-21 ENCOUNTER — OFFICE VISIT (OUTPATIENT)
Dept: OBGYN CLINIC | Age: 39
End: 2019-02-21

## 2019-02-21 VITALS
TEMPERATURE: 98 F | WEIGHT: 239.8 LBS | SYSTOLIC BLOOD PRESSURE: 117 MMHG | HEART RATE: 79 BPM | BODY MASS INDEX: 39.95 KG/M2 | HEIGHT: 65 IN | DIASTOLIC BLOOD PRESSURE: 67 MMHG | OXYGEN SATURATION: 98 %

## 2019-02-21 DIAGNOSIS — N94.6 DYSMENORRHEA: ICD-10-CM

## 2019-02-21 DIAGNOSIS — N92.1 EXCESSIVE AND FREQUENT MENSTRUATION WITH IRREGULAR CYCLE: Primary | ICD-10-CM

## 2019-02-21 DIAGNOSIS — E66.9 OBESITY (BMI 30-39.9): ICD-10-CM

## 2019-02-21 DIAGNOSIS — N89.8 VAGINAL DISCHARGE: ICD-10-CM

## 2019-02-21 DIAGNOSIS — Z11.3 ENCOUNTER FOR SCREENING EXAMINATION FOR SEXUALLY TRANSMITTED DISEASE: ICD-10-CM

## 2019-02-21 DIAGNOSIS — N92.6 IRREGULAR MENSTRUAL CYCLE: ICD-10-CM

## 2019-02-21 NOTE — PATIENT INSTRUCTIONS
Heavy Menstrual Periods: Care Instructions Your Care Instructions Many women get heavy menstrual periods and painful cramps. For some women, this means passing large blood clots and changing sanitary pads or tampons often. You may also have periods that last longer than 7 days. A change in hormones or an irritation in the uterus can cause heavy bleeding. Women who are overweight are more likely to have heavy menstrual periods. But there may not be a specific cause for your heavy menstrual periods. Your doctor may recommend hormone treatments to slow or stop your periods. If a fibroid (a growth that is not cancer) is causing your heavy bleeding, your doctor may recommend surgery or other treatments to remove the growth. Because blood loss from heavy menstrual periods can make you very tired and weak (anemic), your doctor may recommend that you take extra iron. Follow-up care is a key part of your treatment and safety. Be sure to make and go to all appointments, and call your doctor if you are having problems. It's also a good idea to know your test results and keep a list of the medicines you take. How can you care for yourself at home? · Get plenty of rest. 
· Keep a record of your periods. Write down when your period begins and ends and how much flow you have. That means counting the number of pads and tampons you use. Note whether they are soaked. Note any other symptoms. Take this record to your doctor appointments. · Take your medicines exactly as prescribed. Call your doctor if you think you are having a problem with your medicine. · Take pain medicines exactly as directed. ? If the doctor gave you a prescription medicine for pain, take it as prescribed. ? If you are not taking a prescription pain medicine, ask your doctor if you can take an over-the-counter medicine. · Try to reach a healthy weight. If you are trying to lose weight, do it slowly with your doctor's advice. · If you are taking iron pills: ? Try to take the pills about 1 hour before or 2 hours after meals. But you may need to take iron with some food to avoid an upset stomach. ? Vitamin C (from food or pills) helps your body absorb iron. Try taking iron pills with a glass of orange juice or other citrus fruit juice. ? Do not take antacids or drink milk or caffeine drinks (such as coffee, tea, or cola) at the same time or within 2 hours of the time that you take your iron. They can make it hard for your body to absorb the iron. ? Iron pills may cause stomach problems, such as heartburn, nausea, diarrhea, constipation, and cramps. Be sure to drink plenty of fluids, and include fruits, vegetables, and fiber in your diet each day. ? If you forget to take an iron pill, do not take a double dose of iron the next time you take a pill. ? Keep iron pills out of the reach of small children. An overdose of iron can be very dangerous. When should you call for help? Call 911 anytime you think you may need emergency care. For example, call if: 
  · You passed out (lost consciousness).  
 Call your doctor now or seek immediate medical care if: 
  · You have new or worse belly or pelvic pain.  
  · You have severe vaginal bleeding.  
  · You feel dizzy or lightheaded, or you feel like you may faint.  
 Watch closely for changes in your health, and be sure to contact your doctor if: 
  · You think you may be pregnant.  
  · Your bleeding gets worse.  
  · You do not get better as expected. Where can you learn more? Go to http://nenita-anjali.info/. Enter F477 in the search box to learn more about \"Heavy Menstrual Periods: Care Instructions. \" Current as of: May 14, 2018 Content Version: 11.9 © 1018-0489 [employer]. Care instructions adapted under license by [employer] (which disclaims liability or warranty for this information).  If you have questions about a medical condition or this instruction, always ask your healthcare professional. [address] any warranty or liability for your use of this information. Vaginal Bleeding in Nonpregnant Women: Care Instructions Your Care Instructions Many women have bleeding or spotting between periods. Lots of things can cause it. You may bleed because of hormone problems, stress, or ovulation. Fibroids and IUDs (intrauterine devices) can also cause bleeding. If your bleeding or spotting is caused by one of these things and is not heavy or doesn't happen often, you probably don't need to worry. But in rare cases, infection, cancer, or other serious conditions can cause bleeding. So you may need more tests to find the cause of your bleeding. The doctor has checked you carefully, but problems can develop later. If you notice any problems or new symptoms, get medical treatment right away. Follow-up care is a key part of your treatment and safety. Be sure to make and go to all appointments, and call your doctor if you are having problems. It's also a good idea to know your test results and keep a list of the medicines you take. How can you care for yourself at home? · Take pain medicines exactly as directed. ? If the doctor gave you a prescription medicine for pain, take it as prescribed. ? If you are not taking a prescription pain medicine, ask your doctor if you can take an over-the-counter medicine. Do not take aspirin, which may make bleeding worse. · If your doctor prescribed birth control pills for your bleeding, take them as directed. · Eat foods that are high in iron and vitamin C. Foods high in iron include red meat, shellfish, eggs, beans, and leafy green vegetables. Foods high in vitamin C include citrus fruits, tomatoes, and broccoli. Ask your doctor if you need to take iron pills or a multivitamin. · Ask your doctor when it is okay to have sex. When should you call for help? Call 911 anytime you think you may need emergency care. For example, call if: 
  · You passed out (lost consciousness).  
 Call your doctor now or seek immediate medical care if: 
  · You have severe vaginal bleeding.  
  · You are dizzy or lightheaded, or you feel like you may faint.  
  · You have new or worse belly or pelvic pain.  
 Watch closely for changes in your health, and be sure to contact your doctor if: 
  · Your bleeding gets worse.  
  · You think you might be pregnant.  
  · You do not get better as expected. Where can you learn more? Go to http://nenita-anjali.info/. Enter N996 in the search box to learn more about \"Vaginal Bleeding in Nonpregnant Women: Care Instructions. \" Current as of: May 14, 2018 Content Version: 11.9 © 4180-5490 [employer]. Care instructions adapted under license by [employer] (which disclaims liability or warranty for this information). If you have questions about a medical condition or this instruction, always ask your healthcare professional. [place] [address] any warranty or liability for your use of this information. Polycystic Ovary Syndrome: Care Instructions Your Care Instructions Polycystic ovary syndrome, or PCOS, means a woman's hormones are out of balance. It can cause problems with your periods and make it hard to get pregnant. Doctors don't know for sure what causes PCOS, but it seems to run in families. It also seems to be linked to obesity and a risk for diabetes. If you have PCOS, your sisters and daughters have a higher chance of getting it too. You may have other symptoms. These include weight gain, acne, too much hair growth on the face or body, high blood pressure, and high blood sugar. Your ovaries may have cysts on them. These cysts are growths filled with fluid.  
Keep in mind that although you may not have regular periods, you can still get pregnant. Talk to your doctor about birth control if you do not want to get pregnant. Sometimes the hormone changes with PCOS can also make it hard for some women to get pregnant. If this is a concern, talk to your doctor about treatment for this problem. Women who have PCOS can go for months or longer with no period. Your doctor may recommend medicines that can help get your cycles back to normal. 
Follow-up care is a key part of your treatment and safety. Be sure to make and go to all appointments, and call your doctor if you are having problems. It's also a good idea to know your test results and keep a list of the medicines you take. How can you care for yourself at home? · Take your medicines exactly as prescribed. Call your doctor if you think you are having a problem with your medicine. · Eat a healthy diet. Include fruits, vegetables, beans, and whole grains in your diet each day. · If you are overweight, losing weight can help with many of the symptoms of PCOS. Talk to your doctor about safe ways to lose weight. · Get at least 30 minutes of exercise on most days of the week. Walking is a good choice. Or you can run, swim, cycle, or play tennis or team sports. · For hair growth you don't want, try bleaching, plucking, electrolysis, or laser therapy. · Acne can be treated with over-the-counter medicines. Look for ones that have benzoyl peroxide or salicylic acid in them. When should you call for help? Call your doctor now or seek immediate medical care if: 
  · You have severe vaginal bleeding.  
  · You have new or worse belly or pelvic pain.  
 Watch closely for changes in your health, and be sure to contact your doctor if: 
  · You do not get better as expected.  
  · You have unusual vaginal bleeding. Where can you learn more? Go to http://nenita-anjali.info/. Enter K870 in the search box to learn more about \"Polycystic Ovary Syndrome: Care Instructions. \" 
 Current as of: May 14, 2018 Content Version: 11.9 © 5112-5345 [employer]. Care instructions adapted under license by [email or web address] (which disclaims liability or warranty for this information). If you have questions about a medical condition or this instruction, always ask your healthcare professional. [address] any warranty or liability for your use of this information.
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
## 2019-02-24 LAB
C TRACH RRNA SPEC QL NAA+PROBE: NEGATIVE
N GONORRHOEA RRNA SPEC QL NAA+PROBE: NEGATIVE
SPECIMEN STATUS REPORT, ROLRST: NORMAL
T VAGINALIS RRNA VAG QL NAA+PROBE: NEGATIVE

## 2019-02-24 NOTE — PROGRESS NOTES
Can you make sure that her mycoplasma culture got sent and make sure she gave blood for her blood work, thanks!

## 2019-02-28 ENCOUNTER — HOSPITAL ENCOUNTER (OUTPATIENT)
Dept: ULTRASOUND IMAGING | Age: 39
Discharge: HOME | End: 2019-02-28
Attending: OBSTETRICS & GYNECOLOGY
Payer: MEDICAID

## 2019-02-28 DIAGNOSIS — N92.1 EXCESSIVE AND FREQUENT MENSTRUATION WITH IRREGULAR CYCLE: ICD-10-CM

## 2019-02-28 DIAGNOSIS — N92.6 IRREGULAR MENSTRUAL CYCLE: ICD-10-CM

## 2019-02-28 PROCEDURE — 76830 TRANSVAGINAL US NON-OB: CPT

## 2019-03-03 LAB
17OHP SERPL-MCNC: 170 NG/DL
ANDROGEN FAI SERPL-SRTO: 2.7 % (ref 0.4–8.4)
COMMENT, 180044: NORMAL
EST. AVERAGE GLUCOSE BLD GHB EST-MCNC: 94 MG/DL
HBA1C MFR BLD: 4.9 % (ref 4.8–5.6)
HCG INTACT+B SERPL-ACNC: <1 MIU/ML
M GENITALIUM DNA SPEC QL NAA+PROBE: NEGATIVE
PROLACTIN SERPL-MCNC: 12.1 NG/ML (ref 4.8–23.3)
SHBG SERPL-SCNC: 97.7 NMOL/L (ref 24.6–122)
TESTOST SERPL-MCNC: 76 NG/DL (ref 8–48)
TSH SERPL DL<=0.005 MIU/L-ACNC: 4.14 UIU/ML (ref 0.45–4.5)

## 2019-03-04 ENCOUNTER — TELEPHONE (OUTPATIENT)
Dept: OBGYN CLINIC | Age: 39
End: 2019-03-04

## 2019-03-04 NOTE — PROGRESS NOTES
Dear Ms. Iraj Magana, Just wanted to let you know that your labs are overall reassuring. We will review them all in more detail at your next appt on 4/8/2019   2:15 PM. Have a great day, 
 
Dr. Nelsy Camacho

## 2019-03-04 NOTE — TELEPHONE ENCOUNTER
Patient calling stating that she is having extremely heavy bleeding and that she would like to try to be seen asap by [facility] let her know that Dr. Florencia Wright did not have anything and she requested to be seen by a doctor so I placed patient with Dr. Ashley Adhikari for this Thursday. Please give patient call back.

## 2019-03-05 ENCOUNTER — CLINICAL SUPPORT (OUTPATIENT)
Dept: FAMILY MEDICINE CLINIC | Age: 39
End: 2019-03-05

## 2019-03-05 DIAGNOSIS — J30.2 SEASONAL ALLERGIC RHINITIS, UNSPECIFIED TRIGGER: ICD-10-CM

## 2019-03-05 DIAGNOSIS — Z02.0 SCHOOL PHYSICAL EXAM: Primary | ICD-10-CM

## 2019-03-05 DIAGNOSIS — J45.41 MODERATE PERSISTENT ASTHMATIC BRONCHITIS WITH ACUTE EXACERBATION: ICD-10-CM

## 2019-03-05 LAB
MM INDURATION POC: 0 MM (ref 0–5)
PPD POC: NORMAL NEGATIVE

## 2019-03-05 RX ORDER — ALBUTEROL SULFATE 90 UG/1
2 AEROSOL, METERED RESPIRATORY (INHALATION)
Qty: 1 INHALER | Refills: 0 | Status: SHIPPED | OUTPATIENT
Start: 2019-03-05 | End: 2019-04-09 | Stop reason: SDUPTHER

## 2019-03-05 RX ORDER — LORATADINE 10 MG/1
TABLET ORAL
Qty: 30 TAB | Refills: 1 | Status: SHIPPED | OUTPATIENT
Start: 2019-03-05 | End: 2019-08-20

## 2019-03-05 NOTE — TELEPHONE ENCOUNTER
Patients call returned and offered an appointment for tomorrow, patient excepted and transferred to the  for scheduling.

## 2019-03-06 ENCOUNTER — OFFICE VISIT (OUTPATIENT)
Dept: OBGYN CLINIC | Age: 39
End: 2019-03-06

## 2019-03-06 VITALS
TEMPERATURE: 98.8 F | HEIGHT: 65 IN | WEIGHT: 242.8 LBS | HEART RATE: 74 BPM | SYSTOLIC BLOOD PRESSURE: 116 MMHG | OXYGEN SATURATION: 98 % | DIASTOLIC BLOOD PRESSURE: 79 MMHG | BODY MASS INDEX: 40.45 KG/M2

## 2019-03-06 DIAGNOSIS — E28.2 PCOS (POLYCYSTIC OVARIAN SYNDROME): Primary | ICD-10-CM

## 2019-03-06 DIAGNOSIS — E66.01 OBESITY, CLASS III, BMI 40-49.9 (MORBID OBESITY) (HCC): ICD-10-CM

## 2019-03-06 DIAGNOSIS — N94.6 DYSMENORRHEA: ICD-10-CM

## 2019-03-06 DIAGNOSIS — N92.1 MENORRHAGIA WITH IRREGULAR CYCLE: ICD-10-CM

## 2019-03-06 RX ORDER — OFLOXACIN 3 MG/ML
SOLUTION AURICULAR (OTIC)
Refills: 0 | COMMUNITY
Start: 2019-02-20

## 2019-03-06 RX ORDER — IBUPROFEN 800 MG/1
800 TABLET ORAL
Qty: 60 TAB | Refills: 1 | Status: SHIPPED | OUTPATIENT
Start: 2019-03-06 | End: 2020-03-26 | Stop reason: SDUPTHER

## 2019-03-06 RX ORDER — QUETIAPINE FUMARATE 25 MG/1
25 TABLET, FILM COATED ORAL
COMMUNITY
End: 2019-08-20

## 2019-03-06 RX ORDER — NORGESTIMATE AND ETHINYL ESTRADIOL 7DAYSX3 LO
KIT ORAL
COMMUNITY
End: 2019-08-20

## 2019-03-06 RX ORDER — ASCORBIC ACID, CHOLECALCIFEROL, .ALPHA.-TOCOPHEROL ACETATE, DL-, PYRIDOXINE HYDROCHLORIDE, FOLIC ACID, CYANOCOBALAMIN, BIOTIN, CALCIUM CARBONATE, FERROUS ASPARTO GLYCINATE, IRON, POTASSIUM IODIDE, MAGNESIUM OXIDE, DOCONEXENT AND LOWBUSH BLUEBERRY 60; 1000; 10; 26; 400; 13; 280; 80; 9; 9; 150; 25; 350; 25; 600 MG/1; [IU]/1; [IU]/1; MG/1; UG/1; UG/1; UG/1; MG/1; MG/1; MG/1; UG/1; MG/1; MG/1; MG/1; UG/1
CAPSULE, GELATIN COATED ORAL
Refills: 11 | COMMUNITY
Start: 2019-02-19 | End: 2021-03-30

## 2019-03-06 NOTE — PATIENT INSTRUCTIONS
Hysteroscopy: Before Your Procedure  What is a hysteroscopy? A hysteroscopy is a procedure to find and treat problems with your uterus. It may be done to remove growths from the uterus. Or it may be done to diagnose or treat fertility problems. It can also be done to diagnose or treat abnormal bleeding. The doctor will guide a lighted tube through the cervix and into the uterus. This tube is called a hysteroscope, or scope. It lets your doctor see inside your body. The doctor will fill your uterus with air or liquid. This makes it easier to see the inside of your uterus with the scope. The doctor may also put tools through the scope to treat a problem. During this procedure, the doctor may take out a small piece of tissue for study. This is called a biopsy. Or the doctor may gently scrape tissue from the inner wall of the uterus. This is called a dilation and curettage, or D&C. If your doctor filled your uterus with liquid, most it will flow out when the scope is removed. You will most likely go home the same day. Many women are able to go back to work the next day. But it depends on what was done and the type of work you do. Follow-up care is a key part of your treatment and safety. Be sure to make and go to all appointments, and call your doctor if you are having problems. It's also a good idea to know your test results and keep a list of the medicines you take. What happens before the procedure?   Preparing for the procedure    · Understand exactly what procedure is planned, along with the risks, benefits, and other options. · Tell your doctors ALL the medicines, vitamins, supplements, and herbal remedies you take. Some of these can increase the risk of bleeding or interact with anesthesia.     · If you take aspirin or some other blood thinner, be sure to talk to your doctor. He or she will tell you if you should stop taking it before your procedure.  Make sure that you understand exactly what your doctor wants you to do.     · Your doctor will tell you which medicines to take or stop before your procedure. You may need to stop taking certain medicines a week or more before the procedure. So talk to your doctor as soon as you can.     · If you have an advance directive, let your doctor know. It may include a living will and a durable power of  for health care. Bring a copy to the hospital. If you don't have one, you may want to prepare one. It lets your doctor and loved ones know your health care wishes. Doctors advise that everyone prepare these papers before any type of surgery or procedure. Procedures can be stressful. This information will help you understand what you can expect. And it will help you safely prepare for your procedure. What happens on the day of the procedure? · Follow the instructions exactly about when to stop eating and drinking. If you don't, your procedure may be canceled. If your doctor has instructed you to take your medicines on the day of the procedure, take them with only a sip of water.     · Take a bath or shower before you come in for your procedure. Do not apply lotions, perfumes, deodorants, or nail polish.     · Take off all jewelry and piercings. And take out contact lenses, if you wear them.    At the hospital or surgery center   · Bring a picture ID.     · You will be kept comfortable and safe by your anesthesia provider. The anesthesia may make you sleep. Or it may just numb the area being worked on.     · The procedure usually takes less than 30 minutes. Going home   · Be sure you have someone to drive you home. Anesthesia and pain medicine make it unsafe for you to drive.     · You will be given more specific instructions about recovering from your procedure. They will cover things like diet, wound care, follow-up care, driving, and getting back to your normal routine. When should you call your doctor?    · You have questions or concerns.     · You don't understand how to prepare for your procedure.     · You become ill before the procedure (such as fever, flu, or a cold).     · You need to reschedule or have changed your mind about having the procedure. Where can you learn more? Go to http://nenita-anjali.info/. Enter I995 in the search box to learn more about \"Hysteroscopy: Before Your Procedure. \"  Current as of: May 14, 2018  Content Version: 11.9  © 8232-9356 [employer]. Care instructions adapted under license by [employer] (which disclaims liability or warranty for this information). If you have questions about a medical condition or this instruction, always ask your healthcare professional. [place] [address] any warranty or liability for your use of this information. Hysteroscopy: What to Expect at [address] hysteroscopy is a procedure to find and treat problems with your uterus. It may have been done to remove growths from the uterus. Or it may have been done to diagnose or treat fertility problems. It can also be used to diagnose or treat abnormal bleeding. If the doctor filled your uterus with air, you may have gas pains or your belly may feel full. You may have cramps and light vaginal bleeding for a few days to several weeks. The cramps and bleeding may last longer if the hysteroscopy was used for treatment. You may also have shoulder pain right after the procedure. If the doctor filled your uterus with liquid, you may have watery vaginal discharge for several weeks. This care sheet gives you a general idea about how long it will take for you to recover. But each person recovers at a different pace. Follow the steps below to get better as quickly as possible. How can you care for yourself at home?   Activity    · Rest when you feel tired.     · You can do your normal activities when it feels okay to do so.     · You may shower and take baths as usual.     · Ask your doctor when it is okay for you to have sex. Diet    · You can eat your normal diet. If your stomach is upset, try bland, low-fat foods like plain rice, broiled chicken, toast, and yogurt.     · If your bowel movements are not regular right after surgery, try to avoid constipation and straining. Drink plenty of water. Your doctor may suggest fiber, a stool softener, or a mild laxative. Medicines    · Your doctor will tell you if and when you can restart your medicines. He or she will also give you instructions about taking any new medicines.     · If you take aspirin or some other blood thinner, be sure to talk to your doctor. He or she will tell you if and when to start taking this medicine again. Make sure that you understand exactly what your doctor wants you to do.     · Be safe with medicines. Read and follow all instructions on the label. ? If the doctor gave you a prescription medicine for pain, take it as prescribed. ? If you are not taking a prescription pain medicine, ask your doctor if you can take an over-the-counter medicine.     · If your doctor prescribed antibiotics, take them as directed. Do not stop taking them just because you feel better. You need to take the full course of antibiotics. Other instructions    · You may have some light vaginal bleeding. Wear sanitary pads if needed. Do not use tampons until your doctor says it is okay.     · You may want to use a heating pad on your belly to help with pain. Use a low heat setting.     · Talk about birth control with your doctor. Do not try to become pregnant until your doctor says it is okay. Follow-up care is a key part of your treatment and safety. Be sure to make and go to all appointments, and call your doctor if you are having problems. It's also a good idea to know your test results and keep a list of the medicines you take. When should you call for help? Call 911 anytime you think you may need emergency care.  For example, call if:    · You passed out (lost consciousness).     · You have chest pain, are short of breath, or cough up blood.    Call your doctor now or seek immediate medical care if:    · You have pain that does not get better after you take pain medicine.     · You cannot pass stools or gas.     · You have vaginal discharge that has increased in amount or smells bad.     · You are sick to your stomach or cannot drink fluids.     · You have signs of infection, such as:  ? Increased pain, swelling, warmth, or redness. ? A fever.     · You have bright red vaginal bleeding that soaks one or more pads in an hour, or you have large clots.     · You have signs of a blood clot in your leg (called a deep vein thrombosis), such as:  ? Pain in your calf, back of the knee, thigh, or groin. ? Redness and swelling in your leg.    Watch closely for changes in your health, and be sure to contact your doctor if you have any problems. Where can you learn more? Go to http://nenita-anjali.info/. Enter I360 in the search box to learn more about \"Hysteroscopy: What to Expect at Home. \"  Current as of: May 14, 2018  Content Version: 11.9  © 4699-2672 [employer]. Care instructions adapted under license by [employer] (which disclaims liability or warranty for this information). If you have questions about a medical condition or this instruction, always ask your healthcare professional. [place] [address] any warranty or liability for your use of this information. Polycystic Ovary Syndrome: Care Instructions  Your Care Instructions  Polycystic ovary syndrome, or PCOS, means a woman's hormones are out of balance. It can cause problems with your periods and make it hard to get pregnant. Doctors don't know for sure what causes PCOS, but it seems to run in families. It also seems to be linked to obesity and a risk for diabetes.  If you have PCOS, your sisters and daughters have a higher chance of getting it too. You may have other symptoms. These include weight gain, acne, too much hair growth on the face or body, high blood pressure, and high blood sugar. Your ovaries may have cysts on them. These cysts are growths filled with fluid. Keep in mind that although you may not have regular periods, you can still get pregnant. Talk to your doctor about birth control if you do not want to get pregnant. Sometimes the hormone changes with PCOS can also make it hard for some women to get pregnant. If this is a concern, talk to your doctor about treatment for this problem. Women who have PCOS can go for months or longer with no period. Your doctor may recommend medicines that can help get your cycles back to normal.  Follow-up care is a key part of your treatment and safety. Be sure to make and go to all appointments, and call your doctor if you are having problems. It's also a good idea to know your test results and keep a list of the medicines you take. How can you care for yourself at home? · Take your medicines exactly as prescribed. Call your doctor if you think you are having a problem with your medicine. · Eat a healthy diet. Include fruits, vegetables, beans, and whole grains in your diet each day. · If you are overweight, losing weight can help with many of the symptoms of PCOS. Talk to your doctor about safe ways to lose weight. · Get at least 30 minutes of exercise on most days of the week. Walking is a good choice. Or you can run, swim, cycle, or play tennis or team sports. · For hair growth you don't want, try bleaching, plucking, electrolysis, or laser therapy. · Acne can be treated with over-the-counter medicines. Look for ones that have benzoyl peroxide or salicylic acid in them. When should you call for help? Call your doctor now or seek immediate medical care if:    · You have severe vaginal bleeding.     · You have new or worse belly or pelvic pain.  Watch closely for changes in your health, and be sure to contact your doctor if:    · You do not get better as expected.     · You have unusual vaginal bleeding. Where can you learn more? Go to http://nenita-anjali.info/. Enter G303 in the search box to learn more about \"Polycystic Ovary Syndrome: Care Instructions. \"  Current as of: May 14, 2018  Content Version: 11.9  © 3412-2364 [employer]. Care instructions adapted under license by [employer] (which disclaims liability or warranty for this information). If you have questions about a medical condition or this instruction, always ask your healthcare professional. [address] any warranty or liability for your use of this information. Dilation and Curettage: Before Your Procedure  What is dilation and curettage? Dilation and curettage is a type of procedure. It is often called a D&C. It removes tissue from inside your uterus. The doctor may do this to find out if the tissue is not normal. Or it might be done to stop severe bleeding. Sometimes it's done to treat a miscarriage. Your doctor may give you medicine to make you sleep or help you relax. You may also get medicine to help with pain. First, the doctor inserts a tool into your vagina to gently spread it open. Then he or she uses another tool to open the lower part of your uterus. This is called your cervix. Next, the doctor gently scrapes tissue from the uterus with a different tool. This tool may be attached to a vacuum to help suck out the tissue. The procedure usually takes 15 to 30 minutes. You will probably go home the same day. Most women are still able to get pregnant after a D&C. Follow-up care is a key part of your treatment and safety. Be sure to make and go to all appointments, and call your doctor if you are having problems.  It's also a good idea to know your test results and keep a list of the medicines you take.  What happens before the procedure?   Preparing for the procedure    · Understand exactly what procedure is planned, along with the risks, benefits, and other options. · Tell your doctors ALL the medicines, vitamins, supplements, and herbal remedies you take. Some of these can increase the risk of bleeding or interact with anesthesia.     · If you take blood thinners, such as warfarin (Coumadin), clopidogrel (Plavix), or aspirin, be sure to talk to your doctor. He or she will tell you if you should stop taking these medicines before your procedure. Make sure that you understand exactly what your doctor wants you to do.     · Your doctor will tell you which medicines to take or stop before your procedure. You may need to stop taking certain medicines a week or more before the procedure. So talk to your doctor as soon as you can.     · If you have an advance directive, let your doctor know. It may include a living will and a durable power of  for health care. Bring a copy to the hospital. If you don't have one, you may want to prepare one. It lets your doctor and loved ones know your health care wishes. Doctors advise that everyone prepare these papers before any type of surgery or procedure.     · You may go to your doctor's office on the day before the procedure. Your doctor may put a small sponge in your cervix or tablets behind your cervix. This helps to open it. Or the doctor may give you a pill to help open the cervix. Procedures can be stressful. This information will help you understand what you can expect. And it will help you safely prepare for your procedure. What happens on the day of the procedure? · Follow the instructions exactly about when to stop eating and drinking. If you don't, your procedure may be canceled.  If your doctor told you to take your medicines on the day of the procedure, take them with only a sip of water.     · Take a bath or shower before you come in for your procedure. Do not apply lotions, perfumes, deodorants, or nail polish.     · Take off all jewelry and piercings. And take out contact lenses, if you wear them.    At the hospital or surgery center   · Bring a picture ID.     · You will be kept comfortable and safe by your anesthesia provider. You may get medicine that relaxes you or puts you in a light sleep.     · The procedure usually takes 15 to 30 minutes. Going home   · Be sure you have someone to drive you home. Anesthesia and pain medicine make it unsafe for you to drive.     · You will be given more specific instructions about recovering from your procedure. They will cover things like diet, wound care, follow-up care, driving, and getting back to your normal routine. When should you call your doctor? · You have questions or concerns.     · You don't understand how to prepare for your procedure.     · You become ill before the procedure (such as fever, flu, or a cold).     · You need to reschedule or have changed your mind about having the procedure. Where can you learn more? Go to http://nenita-anjali.info/. Enter A855 in the search box to learn more about \"Dilation and Curettage: Before Your Procedure. \"  Current as of: September 5, 2018  Content Version: 11.9  © 2071-3950 [employer]. Care instructions adapted under license by [employer] (which disclaims liability or warranty for this information). If you have questions about a medical condition or this instruction, always ask your healthcare professional. [place] [address] any warranty or liability for your use of this information. Dilation and Curettage: What to Expect at Home  Your Recovery  Dilation and curettage (D&C) is a procedure to remove tissue from the inside of the uterus. The doctor used a curved tool, called a curette, to gently scrape tissue from your uterus.   You are likely to have a backache, or cramps similar to menstrual cramps, and pass small clots of blood from your vagina for the first few days. You may continue to have light vaginal bleeding for several weeks after the procedure. You will probably be able to go back to most of your normal activities in 1 or 2 days. This care sheet gives you a general idea about how long it will take for you to recover. But each person recovers at a different pace. Follow the steps below to get better as quickly as possible. How can you care for yourself at home? Activity    · Rest when you feel tired. Getting enough sleep will help you recover.     · Avoid strenuous activities, such as bicycle riding, jogging, weight lifting, or aerobic exercise, until your doctor says it is okay.     · Most women are able to return to work the day after the procedure.     · You may have some light vaginal bleeding. Wear sanitary pads if needed. Do not douche or use tampons for 2 weeks or until your doctor says it is okay.     · Ask your doctor when it is okay for you to have sex.     · If you could become pregnant, talk about birth control with your doctor. Do not try to become pregnant until your doctor says it is okay. Diet    · You can eat your normal diet. If your stomach is upset, try bland, low-fat foods like plain rice, broiled chicken, toast, and yogurt.     · Drink plenty of fluids (unless your doctor tells you not to). Medicines    · Your doctor will tell you if and when you can restart your medicines. He or she will also give you instructions about taking any new medicines.     · If you take blood thinners, such as warfarin (Coumadin), clopidogrel (Plavix), or aspirin, be sure to talk to your doctor. He or she will tell you if and when to start taking those medicines again. Make sure that you understand exactly what your doctor wants you to do.     · Be safe with medicines. Take pain medicines exactly as directed.   ? If the doctor gave you a prescription medicine for pain, take it as prescribed. ? If you are not taking a prescription pain medicine, ask your doctor if you can take an over-the-counter medicine.     · If you think your pain medicine is making you sick to your stomach:  ? Take your medicine after meals (unless your doctor has told you not to). ? Ask your doctor for a different pain medicine.     · If your doctor prescribed antibiotics, take them as directed. Do not stop taking them just because you feel better. You need to take the full course of antibiotics. Follow-up care is a key part of your treatment and safety. Be sure to make and go to all appointments, and call your doctor if you are having problems. It's also a good idea to know your test results and keep a list of the medicines you take. When should you call for help? Call 911 anytime you think you may need emergency care. For example, call if:    · You passed out (lost consciousness).     · You have chest pain, are short of breath, or cough up blood.    Call your doctor now or seek immediate medical care if:    · You have bright red vaginal bleeding that soaks one or more pads in an hour, or you have large clots.     · You have vaginal discharge that increases in amount or smells bad.     · You are sick to your stomach or cannot drink fluids.     · You have pain that does not get better after you take pain medicine.     · You cannot pass stools or gas.     · You have symptoms of a blood clot in your leg (called a deep vein thrombosis), such as:  ? Pain in your calf, back of the knee, thigh, or groin. ? Redness and swelling in your leg.     · You have signs of infection, such as:  ? Increased pain, swelling, warmth, or redness. ? Red streaks leading from the area. ? Pus draining from the area. ? A fever.    Watch closely for changes in your health, and be sure to contact your doctor if you have any problems. Where can you learn more? Go to http://nenita-anjali.info/.   Enter [phone] in the search box to learn more about \"Dilation and Curettage: What to Expect at Home. \"  Current as of: September 5, 2018  Content Version: 11.9  © 9177-3615 [employer]. Care instructions adapted under license by [employer] (which disclaims liability or warranty for this information). If you have questions about a medical condition or this instruction, always ask your healthcare professional. [place] [address] any warranty or liability for your use of this information.
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
## 2019-03-06 NOTE — PROGRESS NOTES
Reviewed chart with patient,  Patient expressed frustration over her situation. Patient is confused over "[employer]

## 2019-03-07 ENCOUNTER — CLINICAL SUPPORT (OUTPATIENT)
Dept: FAMILY MEDICINE CLINIC | Age: 39
End: 2019-03-07

## 2019-03-07 DIAGNOSIS — Z11.1 ENCOUNTER FOR PPD SKIN TEST READING: Primary | ICD-10-CM

## 2019-03-07 NOTE — PROGRESS NOTES
PPD Reading Note  [facility] Result: 0 mm induration.   Interpretation: neg  If test not read within 48-72 hours of initial placement, patient advised to repeat in other arm 1-3 weeks after this test.  Allergic reaction: no

## 2019-03-14 ENCOUNTER — OFFICE VISIT (OUTPATIENT)
Dept: SURGERY | Age: 39
End: 2019-03-14

## 2019-03-14 VITALS
BODY MASS INDEX: 39.82 KG/M2 | TEMPERATURE: 98 F | HEART RATE: 74 BPM | OXYGEN SATURATION: 97 % | DIASTOLIC BLOOD PRESSURE: 72 MMHG | HEIGHT: 65 IN | SYSTOLIC BLOOD PRESSURE: 111 MMHG | WEIGHT: 239 LBS | RESPIRATION RATE: 16 BRPM

## 2019-03-14 NOTE — PROGRESS NOTES
Chief Complaint   Patient presents with    Advice Only     online seminar confirmed     Pt ID confirmed    Weight Loss Metrics 3/14/2019 3/14/2019 3/6/2019 2/21/2019 12/4/2018 10/17/2018 6/25/2018   Pre op / Initial Wt 239 - - - - - -   Today's Wt - 239 lb 242 lb 12.8 oz 239 lb 12.8 oz 239 lb 236 lb 243 lb   BMI - 39.77 kg/m2 40.4 kg/m2 39.9 kg/m2 39.77 kg/m2 39.27 kg/m2 40.44 kg/m2   Ideal Body Wt 134 - - - - - -   Excess Body Wt 105 - - - - - -   Goal Wt 155 - - - - - -   Wt loss to date 1 - - - - - -   % Wt Loss 0.01 - - - - - -   80% EBW 84 - - - - - -       Body mass index is 39.77 kg/m².

## 2019-03-20 ENCOUNTER — DOCUMENTATION ONLY (OUTPATIENT)
Dept: BARIATRICS/WEIGHT MGMT | Age: 39
End: 2019-03-20

## 2019-03-20 NOTE — PROGRESS NOTES
3/20/19:  Patient stated she is having surgery tomorrow and will need to recover from that. She stated she will call back in April to reschedule her 1st weight loss trial class.     Marcia Dow MS RD

## 2019-03-22 ENCOUNTER — TELEPHONE (OUTPATIENT)
Dept: FAMILY MEDICINE CLINIC | Age: 39
End: 2019-03-22

## 2019-03-22 NOTE — TELEPHONE ENCOUNTER
Patient called requesting an appointment with Dr. Karl Gan for follow up after breast reduction surgery and to discuss the need for home health care after this surgery. She will see Dr. Estelita Salazar on Monday to have her drains removed. She was not sure how soon she needed to make this appointment. I have advised that she contact Dr. Estelita Salazar (her surgeon) to find out how soon they recommend follow up. I will also put in a message to have nurse make Dr. Karl Gan aware and know that follow up is needed.

## 2019-03-26 NOTE — TELEPHONE ENCOUNTER
Patient has an scheduled appointment on 04/17/2019. Offered an sooner appointment. Patient states she will keep her appointment on the 17th.  No other concerns voiced

## 2019-03-26 NOTE — TELEPHONE ENCOUNTER
Patient was to see her general surgeon. He could also order home health for her. She can make an appointment to come in and follow-up with me in 1-2 weeks.   Beni Brenner MD

## 2019-04-09 ENCOUNTER — OFFICE VISIT (OUTPATIENT)
Dept: FAMILY MEDICINE CLINIC | Age: 39
End: 2019-04-09

## 2019-04-09 VITALS
SYSTOLIC BLOOD PRESSURE: 110 MMHG | DIASTOLIC BLOOD PRESSURE: 65 MMHG | HEIGHT: 65 IN | OXYGEN SATURATION: 99 % | RESPIRATION RATE: 18 BRPM | TEMPERATURE: 97.1 F | WEIGHT: 229 LBS | HEART RATE: 72 BPM | BODY MASS INDEX: 38.15 KG/M2

## 2019-04-09 DIAGNOSIS — L90.5 SCAR IRRITATION: ICD-10-CM

## 2019-04-09 DIAGNOSIS — J45.41 MODERATE PERSISTENT ASTHMATIC BRONCHITIS WITH ACUTE EXACERBATION: ICD-10-CM
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
## 2019-04-09 DIAGNOSIS — Z98.890 S/P REDUCTION MAMMOPLASTY: ICD-10-CM

## 2019-04-09 DIAGNOSIS — N62 GYNECOMASTIA: ICD-10-CM

## 2019-04-09 RX ORDER — ALBUTEROL SULFATE 90 UG/1
2 AEROSOL, METERED RESPIRATORY (INHALATION)
Qty: 1 INHALER | Refills: 0 | Status: SHIPPED | OUTPATIENT
Start: 2019-04-09 | End: 2020-03-19 | Stop reason: SDUPTHER

## 2019-04-09 RX ORDER — DOXYCYCLINE 100 MG/1
100 TABLET ORAL 2 TIMES DAILY
Qty: 20 TAB | Refills: 0 | Status: SHIPPED | OUTPATIENT
Start: 2019-04-09 | End: 2019-04-19
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
## 2019-04-29 ENCOUNTER — TELEPHONE (OUTPATIENT)
Dept: FAMILY MEDICINE CLINIC | Age: 39
End: 2019-04-29

## 2019-04-29 NOTE — TELEPHONE ENCOUNTER
Patient is requesting to be contacted when available, want to change the dr she was referred to.  Please contact when available

## 2019-04-30 NOTE — TELEPHONE ENCOUNTER
Attempted to call patient back. Unable to call patient due to phones needing authorization code for long distance calls.

## 2019-04-30 NOTE — TELEPHONE ENCOUNTER
Spoke to patient at this time. She wanted to go to [facility], however, they do not take her insurance. Patient will keep her appointment with [facility].

## 2019-07-29 ENCOUNTER — TELEPHONE (OUTPATIENT)
Dept: FAMILY MEDICINE CLINIC | Age: 39
End: 2019-07-29

## 2019-07-29 NOTE — TELEPHONE ENCOUNTER
Patient called to make an appointment for cramping while taking birth control pills, wanted to come in and have a pap smear done with Mariluz Malone since Dr Nate Baumann is out of the office, no appointments available at time of call so patient would like for a nurse to give her a call with advice. Advised patient to call to see if there is any cancellations on a daily to get her scheduled.  Please be advised

## 2019-08-16 ENCOUNTER — TELEPHONE (OUTPATIENT)
Dept: FAMILY MEDICINE CLINIC | Age: 39
End: 2019-08-16

## 2019-08-16 NOTE — TELEPHONE ENCOUNTER
Patient is requesting to be contacted by the nurse.  Would not say what the call was regarding but seemed agitated that her Dr or nurse was not here today

## 2019-08-19 NOTE — TELEPHONE ENCOUNTER
Called patient back at this time. Appointment was made. Patient was requesting rx for pain medication such as tylenol.  Concerns will be addressed on 08/20/2019

## 2019-08-20 ENCOUNTER — OFFICE VISIT (OUTPATIENT)
Dept: FAMILY MEDICINE CLINIC | Age: 39
End: 2019-08-20

## 2019-08-20 VITALS
WEIGHT: 233 LBS | TEMPERATURE: 97.8 F | RESPIRATION RATE: 16 BRPM | OXYGEN SATURATION: 98 % | SYSTOLIC BLOOD PRESSURE: 112 MMHG | HEART RATE: 76 BPM | HEIGHT: 65 IN | BODY MASS INDEX: 38.82 KG/M2 | DIASTOLIC BLOOD PRESSURE: 80 MMHG

## 2019-08-20 DIAGNOSIS — M79.2 NEUROPATHIC PAIN: Primary | ICD-10-CM

## 2019-08-20 DIAGNOSIS — L60.9 NAIL ABNORMALITY: ICD-10-CM

## 2019-08-20 DIAGNOSIS — E66.01 MORBID OBESITY (HCC): ICD-10-CM

## 2019-08-20 DIAGNOSIS — F31.9 BIPOLAR 1 DISORDER (HCC): ICD-10-CM

## 2019-08-20 RX ORDER — AMITRIPTYLINE HYDROCHLORIDE 10 MG/1
10 TABLET, FILM COATED ORAL
Qty: 30 TAB | Refills: 1 | Status: SHIPPED | OUTPATIENT
Start: 2019-08-20 | End: 2022-10-26

## 2019-08-20 RX ORDER — LEVOCETIRIZINE DIHYDROCHLORIDE 5 MG/1
TABLET, FILM COATED ORAL
COMMUNITY
End: 2021-11-11

## 2020-01-15 ENCOUNTER — TELEPHONE (OUTPATIENT)
Dept: FAMILY MEDICINE CLINIC | Age: 40
End: 2020-01-15

## 2020-01-15 NOTE — TELEPHONE ENCOUNTER
Spoke with patient at this time. Patient states she was seen at urgent care at this time. DX UTI. Medication given to patient. No other concerns noted

## 2020-01-15 NOTE — TELEPHONE ENCOUNTER
Patient called requesting to speak to the nurse for medication for UTI. Offered patient 3:45 appointment today. She is only available to come in earlier. She is requesting a call back from the nurse.

## 2020-03-19 DIAGNOSIS — J45.41 MODERATE PERSISTENT ASTHMATIC BRONCHITIS WITH ACUTE EXACERBATION: ICD-10-CM

## 2020-03-19 NOTE — TELEPHONE ENCOUNTER
Requested Prescriptions     Pending Prescriptions Disp Refills    albuterol (PROVENTIL HFA, VENTOLIN HFA, PROAIR HFA) 90 mcg/actuation inhaler 1 Inhaler 0     Sig: Take 2 Puffs by inhalation every six (6) hours as needed for Wheezing or Shortness of Breath. Patient is requesting an allergy medication but in liquid form.  Please contact patient to clarify this request when available

## 2020-03-22 RX ORDER — ALBUTEROL SULFATE 90 UG/1
2 AEROSOL, METERED RESPIRATORY (INHALATION)
Qty: 1 INHALER | Refills: 0 | Status: SHIPPED | OUTPATIENT
Start: 2020-03-22 | End: 2020-08-27

## 2020-03-26 DIAGNOSIS — J30.2 SEASONAL ALLERGIC RHINITIS, UNSPECIFIED TRIGGER: ICD-10-CM

## 2020-03-26 DIAGNOSIS — N94.6 DYSMENORRHEA: ICD-10-CM

## 2020-03-26 RX ORDER — IBUPROFEN 800 MG/1
800 TABLET ORAL
Qty: 60 TAB | Refills: 1 | Status: SHIPPED | OUTPATIENT
Start: 2020-03-26 | End: 2020-08-27

## 2020-03-26 RX ORDER — FLUTICASONE PROPIONATE 50 MCG
2 SPRAY, SUSPENSION (ML) NASAL
Qty: 1 BOTTLE | Refills: 0 | Status: SHIPPED | OUTPATIENT
Start: 2020-03-26 | End: 2022-10-26

## 2020-03-26 RX ORDER — PHENOLPHTHALEIN 90 MG
10 TABLET,CHEWABLE ORAL DAILY
Qty: 240 ML | Refills: 0 | Status: SHIPPED | OUTPATIENT
Start: 2020-03-26

## 2020-03-26 NOTE — TELEPHONE ENCOUNTER
Patient is request loratadine to be sent to pharmacy in liquid form. Informed patient she can but OTC. She states she want an  RX

## 2020-04-22 ENCOUNTER — VIRTUAL VISIT (OUTPATIENT)
Dept: SURGERY | Age: 40
End: 2020-04-22

## 2020-04-22 DIAGNOSIS — Z01.818 PRE-OP TESTING: Primary | ICD-10-CM

## 2020-04-22 DIAGNOSIS — E66.01 OBESITY, MORBID (HCC): ICD-10-CM

## 2020-04-22 NOTE — PROGRESS NOTES
Chief Complaint   Patient presents with    Morbid Obesity     patient wants to re-enroll in surgical weight loss program. Last weight was 182 lbs on 3/28/20.
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
## 2020-04-24 ENCOUNTER — TELEPHONE (OUTPATIENT)
Dept: SURGERY | Age: 40
End: 2020-04-24

## 2020-05-01 ENCOUNTER — HOSPITAL ENCOUNTER (OUTPATIENT)
Dept: BARIATRICS/WEIGHT MGMT | Age: 40
Discharge: HOME | End: 2020-05-01

## 2020-05-01 ENCOUNTER — DOCUMENTATION ONLY (OUTPATIENT)
Dept: BARIATRICS/WEIGHT MGMT | Age: 40
End: 2020-05-01

## 2020-05-01 NOTE — PROGRESS NOTES
[address] [place] [facility] [address] [facility], [address]    Patient's Name: Kristyn Rose   Age: 42 y.o. YOB: 1980   Sex: female    Date:   5/1/2020    Insurance:              Session: 1 of 6  Revision:     Surgeon:  Dr. Edouard Eaton    Height: 5 f 6.5   Weight:    232      Lbs. BMI: 38.1   Pounds Lost since last month: 0                 Pounds Gained since last month: 0    Starting Weight: 232     Previous Months Weight: 232  Overall Pounds Lost: 0   Overall Pounds Gained: 0    Do you smoke? Yes. Alcohol intake:  Number of drinks at a time:  2 x a month  Number of times a week:     Class Guidelines    Guidelines are reviewed with patient at the start of every class. 1. Patient understands that weight loss trial classes must be consecutive. Patient understands if they miss a class, it is their responsibility to contact me to reschedule class. I will reach out to patient after their first no show. 2.  Patient understands the expectations that weight maintenance/weight loss is expected during the classes. Failure to demonstrate changes may result in one extra month of weight loss trial, followed by going back to see the surgeon. Patient understands that they CAN NOT gain any weight during the weight loss trial.  Gaining weight will result in extra classes. 3. Patient is also instructed to be doing their labs, blood work, psych visit, support group and any other test that the surgeon has used while they are working on their weight loss trial.  4.  Patient was instructed to bring their blue binder to every class and appointment. Other Pertinent Information:     Changes Made Since Last Class: Patient states she is not eating at night. Eating Habits and Behaviors      Today in class we talked about the key diet principles.   We start off each class talking about these principles, which include cutting out liquid calories and focusing on water or other non-calorie, non-carbonated drinks. We also spent time talking about carbohydrates, including foods that have carbohydrates and the goal to keep daily carbohydrates under 100 grams per day. Patient was given ideas of meal and snack choices that are lower in carbohydrates and focus more on protein. Patient was encouraged to start trying protein shakes and was given a list of suggestions. The main topic of class today was: Portion Control. We reviewed in class a power point filled with tips on ways to control portions, including using smaller plates, boxing up portions at a restaurant before starting to eat, and not eating from the container, but rather portioning snacks into smaller bags. Patient's were encouraged to food journal, which helps increase awareness of what and how much they are eating. It was emphasized to patient the importance of reading labels and portion sizes, but also applying these portion sizes. Patient was given a list of items that can help to make portion control easier. For example, a deck of cards or a palm of a hand is a proper portion of meat, a fist is a cup or a proper serving of vegetables. Patient was given 10 tips to help with the portion control. Patient's current diet habits include: Patient is only eating 1 meal per day. Patient states she is eating lunch between 1-4 pm.  She states she is sometimes using a protein shake for breakfast.  Lunch:  Patient states she may eat salmon, shrimp, Navy beans. Patient is drinking 4 glasses of water per day. She states she is drinking orange and cranberry juice. Physical Activity/Exercise    Comments:     Currently for exercise, patient is doing some walking at the park. She states she was going to [employer], but hasn't been doing much with the closures there. .  Patient was given a list of ideas for activity and was encouraged to incorporate 30 minutes a day into their daily routine. Behavior Modification       Comments: In class, we also focused on the behavior aspects of weight management. This includes being a mindful eater and not eating in front of the TV. Patient is also encouraged to take 20 minutes to eat a meal and eat at a table. One goal for next month includes:  1.  3 pounds per week.       Papi Kunz 87 RD  5/1/2020

## 2020-05-04 VITALS — HEIGHT: 65 IN | BODY MASS INDEX: 38.65 KG/M2 | WEIGHT: 232 LBS

## 2020-05-18 ENCOUNTER — TELEPHONE (OUTPATIENT)
Dept: FAMILY MEDICINE CLINIC | Age: 40
End: 2020-05-18

## 2020-05-18 RX ORDER — DIPHENHYDRAMINE HCL 25 MG
25 CAPSULE ORAL
Qty: 30 CAP | Refills: 0 | Status: SHIPPED | OUTPATIENT
Start: 2020-05-18 | End: 2020-05-28

## 2020-05-18 RX ORDER — DIPHENHYDRAMINE HCL 25 MG
25 CAPSULE ORAL
Status: CANCELLED | OUTPATIENT
Start: 2020-05-18 | End: 2020-05-28

## 2020-05-18 NOTE — TELEPHONE ENCOUNTER
Call received as on call provider. Patient states she ate shrimp and is now having allergic reaction- hives to both arms. She denies shortness of breath, swelling of lips, tongue or airway of any kind. Denies angioedema. She was advised to be alert to changing or progressing symptoms and present to ER if s/s of anaphylaxis. Patient does not have benadryl at home but did take loratidine. Will call in benadryl to her pharmacy now. If continued hives/skin reaction will call our office. May possibly need PO steroids.  Will route to PCP as FYI and will update allergy list.

## 2020-05-22 ENCOUNTER — TELEPHONE (OUTPATIENT)
Dept: FAMILY MEDICINE CLINIC | Age: 40
End: 2020-05-22

## 2020-05-22 NOTE — TELEPHONE ENCOUNTER
Patient called stating that she wanted to know if her oral surgeon specialist should have put her on an antibiotics after her procedure. She is nervous that the food she is eating can predispose her to infection. She also is asking me if she can ever eat shellfish again. She was very anxious and asking me several questions that were very difficult for me to answer. I advised her that if she is concerned about an infection that she should either contact her surgeon or she needs to see an urgent care to evaluate and treat her. The patient did ask me the same questions several times and was not allowing me to get off the phone despite explaining to her that she was asking me questions that I was not comfortable answering like what her surgeon should be doing for her, etc.      I have forwarded this message to her PCP. At the end of the call, she was polite and thanked me for the time I spent with her.

## 2020-05-27 ENCOUNTER — TELEPHONE (OUTPATIENT)
Dept: FAMILY MEDICINE CLINIC | Age: 40
End: 2020-05-27

## 2020-05-27 NOTE — TELEPHONE ENCOUNTER
Pt, is requesting a call back from Dr Tonya Almaraz, concerning her allergy condition. Offered a virtual alisa but patient decline at time stating she just wanted to talk to Dr Tonya Almaraz. Explained to pt that is a virtual appointment. Ms Tomlinsonalexander Brochure, stated I just want you to give him the message.  Please be advised and follow up when available

## 2020-05-28 ENCOUNTER — VIRTUAL VISIT (OUTPATIENT)
Dept: FAMILY MEDICINE CLINIC | Age: 40
End: 2020-05-28

## 2020-05-28 DIAGNOSIS — K03.1 DENTAL ABRASION: Primary | ICD-10-CM

## 2020-05-28 RX ORDER — CLINDAMYCIN HYDROCHLORIDE 300 MG/1
300 CAPSULE ORAL 3 TIMES DAILY
Qty: 15 CAP | Refills: 0 | Status: SHIPPED | OUTPATIENT
Start: 2020-05-28 | End: 2020-06-02

## 2020-05-28 NOTE — PROGRESS NOTES
Stephanie Hoover is a 42 y.o. female who was seen by synchronous (real-time) audio-video technology on 5/28/2020. Consent: Stephanie Hoover, who was seen by synchronous (real-time) audio-video technology, and/or her healthcare decision maker, is aware that this patient-initiated, Telehealth encounter on 5/28/2020 is a billable service, with coverage as determined by her insurance carrier. She is aware that she may receive a bill and has provided verbal consent to proceed: Yes. Assessment & Plan:       ICD-10-CM ICD-9-CM    1. Dental abrasion K03.1 521.20 clindamycin (CLEOCIN) 300 mg capsule     I spent at least 25 minutes on this visit with this established patient. Subjective:   Stephanie Hoover is a 42 y.o. female who was seen for Dental Problem    Patient is seen for acute concern. She had a dental procedure recently with an excision of a lesion from her lower lip and gum was done. The area was sutured but she did bite of the sutures in the area opened up slightly. Later on she ate some shrimp/shellfish and developed an allergy to the same. Had a swelling of the lip. Since then it has been a long process for the area to heal.  She feels that that she could be having an infection and she wants to have an antibiotic. She had spoken to her oral surgeon who did not give an antibiotic at that point in time. She is allergic to amoxicillin. Patient feels that the area could get infected. There is some white area surrounding and this is likely that mucosa there is clearing out. She does agree that it is healing. After discussion I will send in clindamycin to take 3 times a day for 5 days. She should do salt water gargles or Listerine. She should let us know in case of no improvement or worsening symptoms. Prior to Admission medications    Medication Sig Start Date End Date Taking? Authorizing Provider   clindamycin (CLEOCIN) 300 mg capsule Take 1 Cap by mouth three (3) times daily for 5 days. 5/28/20 6/2/20 Yes Estella Lu MD   diphenhydrAMINE (BenadryL) 25 mg capsule Take 1 Cap by mouth every four (4) hours as needed for Allergies for up to 10 days. May take 2 tablets (50 mg) for initial dose 5/18/20 5/28/20  Iraj Salinas NP   ibuprofen (MOTRIN) 800 mg tablet Take 1 Tab by mouth every eight (8) hours as needed for Pain. 3/26/20   Estella Lu MD   fluticasone propionate (FLONASE) 50 mcg/actuation nasal spray 2 Sprays by Both Nostrils route daily as needed for Rhinitis or Allergies. 3/26/20   Estella Lu MD   loratadine (Claritin) 5 mg/5 mL syrup Take 10 mL by mouth daily. 3/26/20   Estella Lu MD   albuterol (PROVENTIL HFA, VENTOLIN HFA, PROAIR HFA) 90 mcg/actuation inhaler Take 2 Puffs by inhalation every six (6) hours as needed for Wheezing or Shortness of Breath. 3/22/20   Estella Lu MD   levocetirizine (XYZAL) 5 mg tablet Take  by mouth. Provider, Historical   norethindrone-e.estradiol-iron (LO LOESTRIN FE) 1 mg-10 mcg (24)/10 mcg (2) tab Take  by mouth. Provider, Historical   amitriptyline (ELAVIL) 10 mg tablet Take 1 Tab by mouth nightly. 8/20/19   Estella Lu MD   PRENATE MINI, FERR ASP GLYCIN, 18-1-350 mg cap TAKE 1 CAPSULE BY MOUTH EVERY DAY 2/19/19   Provider, Historical   ofloxacin (FLOXIN) 0.3 % otic solution ADMINISTER 5 DROPS INTO EACH EAR DAILY 2/20/19   Provider, Historical   hydrocortisone (HYTONE) 2.5 % topical cream Apply  to affected area two (2) times a day. use thin layer 10/22/18   Estella Lu MD   clonazePAM (KLONOPIN) 0.5 mg tablet  3/18/17   Provider, Historical     Allergies   Allergen Reactions    Latex, Natural Rubber Hives    Amoxicillin Hives and Unknown (comments)    Pork Derived (Porcine) Nausea and Vomiting    Shrimp Hives     ROS Review of all systems is negative except as noted above in the HPI. Objective:   Vital Signs: (As obtained by patient/caregiver at home)  There were no vitals taken for this visit. Constitutional: [x] Appears well-developed and well-nourished [x] No apparent distress      Mental status: [x] Alert and awake  [x] Oriented to person/place/time [x] Able to follow commands      HENT: [x] Normocephalic, atraumatic   [x] Mouth/Throat: Mucous membranes are moist, I am unable to visualize the area of the lesion due to limitations with the camera. Pulmonary/Chest: [x] Respiratory effort normal   [x] No visualized signs of difficulty breathing or respiratory distress           Neurological:        [x] No Facial Asymmetry (Cranial nerve 7 motor function) (limited exam due to video visit)                   Psychiatric:       [x] Normal Affect    We discussed the expected course, resolution and complications of the diagnosis(es) in detail. Medication risks, benefits, costs, interactions, and alternatives were discussed as indicated. I advised her to contact the office if her condition worsens, changes or fails to improve as anticipated. She expressed understanding with the diagnosis(es) and plan. Kristyn Rose is a 42 y.o. female who was evaluated by a video visit encounter for concerns as above. Patient identification was verified prior to start of the visit. A caregiver was present when appropriate. Due to this being a TeleHealth encounter (During [facility]- public health emergency), evaluation of the following organ systems was limited: Vitals/Constitutional/EENT/Resp/CV/GI//MS/Neuro/Skin/Heme-Lymph-Imm. Pursuant to the emergency declaration under the [facility], [facility] waiver authority and the [employer] Act, this Virtual  Visit was conducted, with patient's (and/or legal guardian's) consent, to reduce the patient's risk of exposure to COVID-19 and provide necessary medical care. Services were provided through a video synchronous discussion virtually to substitute for in-person clinic visit. Patient and provider were located at their individual homes.       Sofia Moreira MD

## 2020-06-05 ENCOUNTER — DOCUMENTATION ONLY (OUTPATIENT)
Dept: BARIATRICS/WEIGHT MGMT | Age: 40
End: 2020-06-05

## 2020-06-05 ENCOUNTER — HOSPITAL ENCOUNTER (OUTPATIENT)
Dept: BARIATRICS/WEIGHT MGMT | Age: 40
Discharge: HOME | End: 2020-06-05

## 2020-06-05 NOTE — PROGRESS NOTES
6/5/20:  Patient was scheduled for her 2nd weight loss trial class by phone. Patient was e-mailed a 28 minute video to watch and was required to send me a pass code back for verification that the video was watched. Patient was then given a window of 1-3 pm that I would call for a diet history on Friday, June 5. I called patient at 3:06 pm (slightly behind due to having 14 patients in class). Patient immediately answered the phone yelling at me and stated how dare I call at 3:06. She stated she was given a window of 1-3 pm and I have no business calling her at 3:06. I apologized to patient for being behind and asked if we could get her diet history now. Patient indicated that she watched my video and doesn't understand why she even needs to talk to me because my video was excessive, I repeated myself over and over, and she is not going to talk to me right now. I discussed with patient that per insurance, there needs to be education that focuses on behavior, exercise, and food, which is what the video is. Talking to me on the phone is to get a diet history. Patient stated that I can quit acting like I give a damn about anyone or their eating habits. She watched my damn video and better credit for today. She proceeded to tell me to reschedule her after her birthday. I discussed with patient that I am not going to allow her to talk to me like this and will not reschedule until I speak to the surgeon. Patient proceeded to say, \"I am done with having this surgery and want no part of any of you,\" and she hung up.     Alena Hurst, MS RD

## 2020-08-10 ENCOUNTER — VIRTUAL VISIT (OUTPATIENT)
Dept: FAMILY MEDICINE CLINIC | Age: 40
End: 2020-08-10

## 2020-08-10 DIAGNOSIS — R21 RASH AND NONSPECIFIC SKIN ERUPTION: Primary | ICD-10-CM

## 2020-08-10 DIAGNOSIS — J45.41 MODERATE PERSISTENT ASTHMATIC BRONCHITIS WITH ACUTE EXACERBATION: ICD-10-CM

## 2020-08-10 DIAGNOSIS — J30.2 SEASONAL ALLERGIC RHINITIS, UNSPECIFIED TRIGGER: ICD-10-CM

## 2020-08-10 RX ORDER — CLOTRIMAZOLE AND BETAMETHASONE DIPROPIONATE 10; .64 MG/G; MG/G
CREAM TOPICAL
Qty: 45 G | Refills: 0 | Status: SHIPPED | OUTPATIENT
Start: 2020-08-10 | End: 2020-08-17

## 2020-08-10 NOTE — PROGRESS NOTES
Iesha Khan is a 47 y.o. female who was seen by synchronous (real-time) audio-video technology on 8/10/2020 for No chief complaint on file. Assessment & Plan:       ICD-10-CM ICD-9-CM    1. Rash and nonspecific skin eruption  R21 782.1 clotrimazole-betamethasone (LOTRISONE) topical cream   2. Seasonal allergic rhinitis, unspecified trigger  J30.2 477.9    3. Moderate persistent asthmatic bronchitis with acute exacerbation  J45.41 493.92      Subjective:   Iesha Khan is seen for follow-up care. Patient has a rash that is erythematous lower abdomen and along her garment line where the elastic band is at. She denies being allergic to any of her garments or the materials used. She would like a cream to apply. I will send in Lotrisone cream to apply to affected areas. She will let us know if the rash persist within the next 5 days. It happens she will also come in for evaluation. Patient has a history of allergy. She does have sneezing, nasal congestion for which she is on Claritin and Flonase. She also does get wheezing and takes albuterol for this. She will continue with these medications. Prior to Admission medications    Medication Sig Start Date End Date Taking? Authorizing Provider   clotrimazole-betamethasone (LOTRISONE) topical cream Apply thin layer to affected area 2 times a day 8/10/20 8/17/20 Yes Estella Lu MD   ibuprofen (MOTRIN) 800 mg tablet Take 1 Tab by mouth every eight (8) hours as needed for Pain. 3/26/20   Estella Lu MD   fluticasone propionate (FLONASE) 50 mcg/actuation nasal spray 2 Sprays by Both Nostrils route daily as needed for Rhinitis or Allergies. 3/26/20   Estella Lu MD   loratadine (Claritin) 5 mg/5 mL syrup Take 10 mL by mouth daily. 3/26/20   Estella Lu MD   albuterol (PROVENTIL HFA, VENTOLIN HFA, PROAIR HFA) 90 mcg/actuation inhaler Take 2 Puffs by inhalation every six (6) hours as needed for Wheezing or Shortness of Breath. 3/22/20   Estella Lu MD   levocetirizine (XYZAL) 5 mg tablet Take  by mouth. Provider, Historical   norethindrone-e.estradiol-iron (LO LOESTRIN FE) 1 mg-10 mcg (24)/10 mcg (2) tab Take  by mouth. Provider, Historical   amitriptyline (ELAVIL) 10 mg tablet Take 1 Tab by mouth nightly. 8/20/19   Estella Lu MD   PRENATE MINI, FERR ASP GLYCIN, 18-1-350 mg cap TAKE 1 CAPSULE BY MOUTH EVERY DAY 2/19/19   Provider, Historical   ofloxacin (FLOXIN) 0.3 % otic solution ADMINISTER 5 DROPS INTO EACH EAR DAILY 2/20/19   Provider, Historical   hydrocortisone (HYTONE) 2.5 % topical cream Apply  to affected area two (2) times a day. use thin layer 10/22/18 8/10/20  Estella Lu MD   clonazePAM (KLONOPIN) 0.5 mg tablet  3/18/17   Provider, Historical     ROS Review of all systems is negative except as noted above in the HPI. Objective:   No flowsheet data found. Constitutional: [x] Appears well-developed and well-nourished [x] No apparent distress      Mental status: [x] Alert and awake  [x] Oriented to person/place/time [x] Able to follow commands      Pulmonary/Chest: [x] Respiratory effort normal   [x] No visualized signs of difficulty breathing or respiratory distress           Neurological:        [x] No Facial Asymmetry (Cranial nerve 7 motor function) (limited exam due to video visit)                     Psychiatric:       [x] Normal Affect    We discussed the expected course, resolution and complications of the diagnosis(es) in detail. Medication risks, benefits, costs, interactions, and alternatives were discussed as indicated. I advised her to contact the office if her condition worsens, changes or fails to improve as anticipated. She expressed understanding with the diagnosis(es) and plan.        Stephenson Smoker, who was evaluated through a patient-initiated, synchronous (real-time) audio-video encounter, and/or her healthcare decision maker, is aware that it is a billable service, with coverage as determined by her insurance carrier. She provided verbal consent to proceed: Yes, and patient identification was verified. It was conducted pursuant to the emergency declaration under the [address] [place] [address] authority and the [employer] General Act. A caregiver was present when appropriate. Ability to conduct physical exam was limited. I was in the office. The patient was at home.       Tacos Roblero MD

## 2020-12-02 NOTE — PATIENT INSTRUCTIONS
Body Mass Index: Care Instructions  Your Care Instructions    Body mass index (BMI) can help you see if your weight is raising your risk for health problems. It uses a formula to compare how much you weigh with how tall you are. · A BMI lower than 18.5 is considered underweight. · A BMI between 18.5 and 24.9 is considered healthy. · A BMI between 25 and 29.9 is considered overweight. A BMI of 30 or higher is considered obese. If your BMI is in the normal range, it means that you have a lower risk for weight-related health problems. If your BMI is in the overweight or obese range, you may be at increased risk for weight-related health problems, such as high blood pressure, heart disease, stroke, arthritis or joint pain, and diabetes. If your BMI is in the underweight range, you may be at increased risk for health problems such as fatigue, lower protection (immunity) against illness, muscle loss, bone loss, hair loss, and hormone problems. BMI is just one measure of your risk for weight-related health problems. You may be at higher risk for health problems if you are not active, you eat an unhealthy diet, or you drink too much alcohol or use tobacco products. Follow-up care is a key part of your treatment and safety. Be sure to make and go to all appointments, and call your doctor if you are having problems. It's also a good idea to know your test results and keep a list of the medicines you take. How can you care for yourself at home? · Practice healthy eating habits. This includes eating plenty of fruits, vegetables, whole grains, lean protein, and low-fat dairy. · If your doctor recommends it, get more exercise. Walking is a good choice. Bit by bit, increase the amount you walk every day. Try for at least 30 minutes on most days of the week. · Do not smoke. Smoking can increase your risk for health problems. If you need help quitting, talk to your doctor about stop-smoking programs and medicines. These can increase your chances of quitting for good. · Limit alcohol to 2 drinks a day for men and 1 drink a day for women. Too much alcohol can cause health problems. If you have a BMI higher than 25  · Your doctor may do other tests to check your risk for weight-related health problems. This may include measuring the distance around your waist. A waist measurement of more than 40 inches in men or 35 inches in women can increase the risk of weight-related health problems. · Talk with your doctor about steps you can take to stay healthy or improve your health. You may need to make lifestyle changes to lose weight and stay healthy, such as changing your diet and getting regular exercise. If you have a BMI lower than 18.5  · Your doctor may do other tests to check your risk for health problems. · Talk with your doctor about steps you can take to stay healthy or improve your health. You may need to make lifestyle changes to gain or maintain weight and stay healthy, such as getting more healthy foods in your diet and doing exercises to build muscle. Where can you learn more? Go to http://nenita-anjali.info/. Enter S176 in the search box to learn more about \"Body Mass Index: Care Instructions. \"  Current as of: October 13, 2016  Content Version: 11.4  © 1561-6457 [employer]. Care instructions adapted under license by [employer] (which disclaims liability or warranty for this information). If you have questions about a medical condition or this instruction, always ask your healthcare professional. [address] any warranty or liability for your use of this information. Island Pedicle Flap With Canthal Suspension Text: The defect edges were debeveled with a #15 scalpel blade.  Given the location of the defect, shape of the defect and the proximity to free margins an island pedicle advancement flap was deemed most appropriate.  Using a sterile surgical marker, an appropriate advancement flap was drawn incorporating the defect, outlining the appropriate donor tissue and placing the expected incisions within the relaxed skin tension lines where possible. The area thus outlined was incised deep to adipose tissue with a #15 scalpel blade.  The skin margins were undermined to an appropriate distance in all directions around the primary defect and laterally outward around the island pedicle utilizing iris scissors.  There was minimal undermining beneath the pedicle flap. A suspension suture was placed in the canthal tendon to prevent tension and prevent ectropion.

## 2020-12-03 ENCOUNTER — TELEPHONE (OUTPATIENT)
Dept: FAMILY MEDICINE CLINIC | Age: 40
End: 2020-12-03

## 2021-02-12 ENCOUNTER — TELEPHONE (OUTPATIENT)
Dept: FAMILY MEDICINE CLINIC | Age: 41
End: 2021-02-12

## 2021-02-12 NOTE — TELEPHONE ENCOUNTER
Patient called stating she slept in her contacts, eye very red and irritated. Would like to speak with a nurse. I suggested calling her eye doctor and she said, Dr. Jose Zacarias always takes care of her.   Please give her a call to discuss  [phone]

## 2021-03-30 ENCOUNTER — TELEPHONE (OUTPATIENT)
Dept: FAMILY MEDICINE CLINIC | Age: 41
End: 2021-03-30

## 2021-03-30 ENCOUNTER — VIRTUAL VISIT (OUTPATIENT)
Dept: FAMILY MEDICINE CLINIC | Age: 41
End: 2021-03-30
Payer: MEDICAID

## 2021-03-30 DIAGNOSIS — J45.41 MODERATE PERSISTENT ASTHMATIC BRONCHITIS WITH ACUTE EXACERBATION: Primary | ICD-10-CM

## 2021-03-30 DIAGNOSIS — L73.9 FOLLICULITIS: ICD-10-CM

## 2021-03-30 DIAGNOSIS — T78.40XD ALLERGY, SUBSEQUENT ENCOUNTER: ICD-10-CM

## 2021-03-30 PROCEDURE — 99213 OFFICE O/P EST LOW 20 MIN: CPT | Performed by: FAMILY MEDICINE

## 2021-03-30 RX ORDER — ALBUTEROL SULFATE 90 UG/1
AEROSOL, METERED RESPIRATORY (INHALATION)
Qty: 18 G | Refills: 2 | Status: SHIPPED | OUTPATIENT
Start: 2021-03-30

## 2021-03-30 RX ORDER — DOXYCYCLINE 100 MG/1
100 TABLET ORAL 2 TIMES DAILY
Qty: 20 TAB | Refills: 0 | Status: SHIPPED | OUTPATIENT
Start: 2021-03-30 | End: 2021-04-09

## 2021-03-30 NOTE — TELEPHONE ENCOUNTER
Patient called stating she was informed by the pharmacy that her insurance will only cover capsules for doxycycline, not tablets.

## 2021-03-30 NOTE — PROGRESS NOTES
[facility] called requesting verbal to switch from tablets to capsules-that is all insurance would pay for-verbal given

## 2021-03-30 NOTE — PROGRESS NOTES
Chief Complaint   Patient presents with    Asthma     handicapp decal concerns     1. Have you been to the ER, urgent care clinic since your last visit? Hospitalized since your last visit? No    2. Have you seen or consulted any other health care providers outside of the [address] [place] [address] since your last visit? Include any pap smears or colon screening.  No

## 2021-03-30 NOTE — PROGRESS NOTES
Waylon Aiken is a 47 y.o. female who was seen by synchronous (real-time) audio-video technology on 3/30/2021 for Asthma (handicapp decal concerns)        Assessment & Plan:       ICD-10-CM ICD-9-CM    1. Moderate persistent asthmatic bronchitis with acute exacerbation  J45.41 493.92 albuterol (PROVENTIL HFA, VENTOLIN HFA, PROAIR HFA) 90 mcg/actuation inhaler   2. Folliculitis  Q68.9 599.4 doxycycline (ADOXA) 100 mg tablet   3. Allergy, subsequent encounter  T78.40XD V58.89              Subjective:   Waylon Aiken is seen for follow-up care. Asthma: Patient has asthma. Has been having episodes of wheeze and shortness of breath lately. She is on albuterol inhaler. Has run out of medication. Would like a refill of the same. Prescription will be sent in. Allergy: Patient has allergies. She is seen by the ENT specialist.  She is on Xyzal, Claritin and also uses Flonase nasal spray. She will continue these medications. Folliculitis: Patient has folliculitis lesions lower abdominal wall along the panty line. She has used doxycycline for this in the past with good result. Would like a prescription sent into the pharmacy. Disability: Patient will make an appointment to come in to discuss disability paperwork. She is to get her disability parking space. Prior to Admission medications    Medication Sig Start Date End Date Taking? Authorizing Provider   albuterol (PROVENTIL HFA, VENTOLIN HFA, PROAIR HFA) 90 mcg/actuation inhaler inhale 2 puffs by mouth and INTO THE LUNGS every 6 hours if needed for WHEEZING AND SHORTNESS OF BREATH 3/30/21  Yes Estella Lu MD   doxycycline (ADOXA) 100 mg tablet Take 1 Tab by mouth two (2) times a day for 10 days. 3/30/21 4/9/21 Yes Estella Lu MD   ibuprofen (MOTRIN) 800 mg tablet Take 1 Tab by mouth every eight (8) hours as needed for Pain.  8/27/20  Yes Estella Lu MD   fluticasone propionate (FLONASE) 50 mcg/actuation nasal spray 2 Sprays by Both Nostrils route daily as needed for Rhinitis or Allergies. 3/26/20  Yes Estella Lu MD   loratadine (Claritin) 5 mg/5 mL syrup Take 10 mL by mouth daily. 3/26/20  Yes Estella Lu MD   levocetirizine (XYZAL) 5 mg tablet Take  by mouth. Yes Provider, Historical   amitriptyline (ELAVIL) 10 mg tablet Take 1 Tab by mouth nightly. 8/20/19  Yes Margie Manzanares MD   ofloxacin (FLOXIN) 0.3 % otic solution ADMINISTER 5 DROPS INTO EACH EAR DAILY 2/20/19  Yes Provider, Historical   clonazePAM (KLONOPIN) 0.5 mg tablet  3/18/17  Yes Provider, Historical   albuterol (PROVENTIL HFA, VENTOLIN HFA, PROAIR HFA) 90 mcg/actuation inhaler inhale 2 puffs by mouth and INTO THE LUNGS every 6 hours if needed for WHEEZING AND SHORTNESS OF BREATH 8/27/20 3/30/21  Estella Lu MD   norethindrone-e.estradiol-iron (LO LOESTRIN FE) 1 mg-10 mcg (24)/10 mcg (2) tab Take  by mouth. Provider, Historical   PRENATE MINI, FERR ASP GLYCIN, 18-1-350 mg cap TAKE 1 CAPSULE BY MOUTH EVERY DAY 2/19/19 3/30/21  Provider, Historical     ROS Review of all systems is negative except as noted above in the HPI. Objective:     Patient-Reported Vitals 3/30/2021   Patient-Reported Weight 222 lbs   Patient-Reported LMP 02-     Constitutional: [x] Appears well-developed and well-nourished [x] No apparent distress     Mental status: [x] Alert and awake  [x] Oriented to person/place/time [x] Able to follow commands     HENT: [x] Normocephalic, atraumatic      Pulmonary/Chest: [x] Respiratory effort normal   [x] No visualized signs of difficulty breathing or respiratory distress            Neurological:        [x] No Facial Asymmetry (Cranial nerve 7 motor function) (limited exam due to video visit)                     Psychiatric:       [x] Normal Affect    We discussed the expected course, resolution and complications of the diagnosis(es) in detail.   Medication risks, benefits, costs, interactions, and alternatives were discussed as indicated. I advised her to contact the office if her condition worsens, changes or fails to improve as anticipated. She expressed understanding with the diagnosis(es) and plan. Sarahi Temple, was evaluated through a synchronous (real-time) audio-video encounter. The patient (or guardian if applicable) is aware that this is a billable service. Verbal consent to proceed has been obtained within the past 12 months. The visit was conducted pursuant to the emergency declaration under the [address] [place] [address], [address] [place] [address] authority and the [employer] General Act. Patient identification was verified, and a caregiver was present when appropriate. The patient was located in a state where the provider was credentialed to provide care.       Marcelina Balderas MD

## 2021-03-31 RX ORDER — DOXYCYCLINE 100 MG/1
100 CAPSULE ORAL 2 TIMES DAILY
Qty: 20 CAP | Refills: 0 | Status: SHIPPED | OUTPATIENT
Start: 2021-03-31 | End: 2022-01-26

## 2021-04-15 ENCOUNTER — OFFICE VISIT (OUTPATIENT)
Dept: FAMILY MEDICINE CLINIC | Age: 41
End: 2021-04-15
Payer: MEDICAID

## 2021-04-15 VITALS
BODY MASS INDEX: 41.99 KG/M2 | SYSTOLIC BLOOD PRESSURE: 121 MMHG | HEART RATE: 73 BPM | RESPIRATION RATE: 18 BRPM | TEMPERATURE: 97.6 F | WEIGHT: 252 LBS | DIASTOLIC BLOOD PRESSURE: 74 MMHG | OXYGEN SATURATION: 98 % | HEIGHT: 65 IN

## 2021-04-15 DIAGNOSIS — F31.9 BIPOLAR 1 DISORDER (HCC): ICD-10-CM

## 2021-04-15 DIAGNOSIS — G89.29 CHRONIC MIDLINE LOW BACK PAIN, UNSPECIFIED WHETHER SCIATICA PRESENT: ICD-10-CM

## 2021-04-15 DIAGNOSIS — E66.01 MORBID OBESITY (HCC): ICD-10-CM

## 2021-04-15 DIAGNOSIS — M54.50 CHRONIC MIDLINE LOW BACK PAIN, UNSPECIFIED WHETHER SCIATICA PRESENT: ICD-10-CM

## 2021-04-15 DIAGNOSIS — L73.9 FOLLICULITIS: ICD-10-CM

## 2021-04-15 DIAGNOSIS — J45.41 MODERATE PERSISTENT ASTHMATIC BRONCHITIS WITH ACUTE EXACERBATION: Primary | ICD-10-CM

## 2021-04-15 PROCEDURE — 99214 OFFICE O/P EST MOD 30 MIN: CPT | Performed by: FAMILY MEDICINE
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
## 2021-07-19 ENCOUNTER — TRANSCRIBE ORDER (OUTPATIENT)
Dept: SCHEDULING | Age: 41
End: 2021-07-19

## 2021-07-19 DIAGNOSIS — M54.12 CERVICAL RADICULOPATHY: Primary | ICD-10-CM

## 2021-07-23 DIAGNOSIS — N94.6 DYSMENORRHEA: ICD-10-CM

## 2021-07-25 ENCOUNTER — HOSPITAL ENCOUNTER (OUTPATIENT)
Age: 41
Discharge: HOME | End: 2021-07-25
Attending: PHYSICIAN ASSISTANT
Payer: MEDICAID

## 2021-07-25 DIAGNOSIS — M54.12 CERVICAL RADICULOPATHY: ICD-10-CM

## 2021-07-25 PROCEDURE — 72141 MRI NECK SPINE W/O DYE: CPT

## 2021-07-25 RX ORDER — IBUPROFEN 800 MG/1
TABLET ORAL
Qty: 60 TABLET | Refills: 0 | Status: SHIPPED | OUTPATIENT
Start: 2021-07-25 | End: 2021-11-11

## 2021-08-03 ENCOUNTER — TELEPHONE (OUTPATIENT)
Dept: FAMILY MEDICINE CLINIC | Age: 41
End: 2021-08-03

## 2021-08-03 NOTE — TELEPHONE ENCOUNTER
Patient took off her fake nails and there is fungus underneath. Provider doesn't have any available appointments until Sept. She'd like some advise on what to do, if she should use the OTC medication.

## 2021-08-04 RX ORDER — PRENATAL VIT 91/IRON/FOLIC/DHA 28-975-200
COMBINATION PACKAGE (EA) ORAL 2 TIMES DAILY
Qty: 1 TUBE | Refills: 0 | Status: SHIPPED | OUTPATIENT
Start: 2021-08-04 | End: 2021-08-18

## 2021-08-11 RX ORDER — OFLOXACIN 3 MG/ML
SOLUTION AURICULAR (OTIC)
Refills: 0 | OUTPATIENT
Start: 2021-08-11

## 2021-08-11 NOTE — TELEPHONE ENCOUNTER
Called patient and advised patient that Dr Centeno Mention was out of the office and that the Rx was not orginially prescribed by this provider.   Nay Jack advised she was trying to get a [facility] visit with [facility].

## 2021-08-11 NOTE — TELEPHONE ENCOUNTER
Requested Prescriptions     Pending Prescriptions Disp Refills    ofloxacin (FLOXIN) 0.3 % otic solution  0     Patient is out.

## 2021-11-11 ENCOUNTER — OFFICE VISIT (OUTPATIENT)
Dept: FAMILY MEDICINE CLINIC | Age: 41
End: 2021-11-11
Payer: MEDICAID

## 2021-11-11 VITALS
TEMPERATURE: 96.5 F | HEIGHT: 65 IN | RESPIRATION RATE: 18 BRPM | SYSTOLIC BLOOD PRESSURE: 115 MMHG | WEIGHT: 248 LBS | BODY MASS INDEX: 41.32 KG/M2 | DIASTOLIC BLOOD PRESSURE: 76 MMHG | OXYGEN SATURATION: 97 % | HEART RATE: 68 BPM

## 2021-11-11 DIAGNOSIS — Z23 ENCOUNTER FOR IMMUNIZATION: ICD-10-CM

## 2021-11-11 DIAGNOSIS — F31.9 BIPOLAR 1 DISORDER (HCC): ICD-10-CM

## 2021-11-11 DIAGNOSIS — M79.10 MUSCLE ACHE: Primary | ICD-10-CM

## 2021-11-11 DIAGNOSIS — J30.2 SEASONAL ALLERGIC RHINITIS, UNSPECIFIED TRIGGER: ICD-10-CM

## 2021-11-11 DIAGNOSIS — E66.01 MORBID OBESITY (HCC): ICD-10-CM

## 2021-11-11 PROBLEM — F99 MENTAL DISORDER: Status: ACTIVE | Noted: 2021-11-11

## 2021-11-11 PROBLEM — J45.909 ASTHMA: Status: ACTIVE | Noted: 2021-11-11

## 2021-11-11 PROBLEM — E16.2 HYPOGLYCEMIA: Status: ACTIVE | Noted: 2021-11-11

## 2021-11-11 PROCEDURE — 90686 IIV4 VACC NO PRSV 0.5 ML IM: CPT | Performed by: FAMILY MEDICINE

## 2021-11-11 PROCEDURE — 99213 OFFICE O/P EST LOW 20 MIN: CPT | Performed by: FAMILY MEDICINE

## 2021-11-11 RX ORDER — BACLOFEN 10 MG/1
10 TABLET ORAL 2 TIMES DAILY
COMMUNITY
Start: 2021-08-04

## 2021-11-11 RX ORDER — DICLOFENAC SODIUM 10 MG/G
1 GEL TOPICAL 4 TIMES DAILY
Qty: 350 G | Refills: 0 | Status: SHIPPED | OUTPATIENT
Start: 2021-11-11

## 2021-11-11 RX ORDER — ASCORBIC ACID, CHOLECALCIFEROL, .ALPHA.-TOCOPHEROL ACETATE, DL-, PYRIDOXINE HYDROCHLORIDE, FOLIC ACID, CYANOCOBALAMIN, BIOTIN, CALCIUM CARBONATE, FERROUS ASPARTO GLYCINATE, IRON, POTASSIUM IODIDE, MAGNESIUM OXIDE, DOCONEXENT AND LOWBUSH BLUEBERRY 60; 1000; 10; 26; 400; 13; 280; 80; 9; 9; 150; 25; 350; 25; 600 MG/1; [IU]/1; [IU]/1; MG/1; UG/1; UG/1; UG/1; MG/1; MG/1; MG/1; UG/1; MG/1; MG/1; MG/1; UG/1
1 CAPSULE, GELATIN COATED ORAL DAILY
COMMUNITY
Start: 2021-11-01

## 2021-11-11 RX ORDER — NAPROXEN 500 MG/1
500 TABLET ORAL 2 TIMES DAILY WITH MEALS
COMMUNITY
Start: 2021-08-17 | End: 2021-11-11

## 2021-11-11 RX ORDER — CARIPRAZINE 3 MG/1
3 CAPSULE, GELATIN COATED ORAL DAILY
COMMUNITY
Start: 2021-09-24 | End: 2022-10-26

## 2021-11-11 RX ORDER — FLUOROMETHOLONE 1 MG/ML
0.1 SOLUTION/ DROPS OPHTHALMIC 3 TIMES DAILY
COMMUNITY

## 2021-11-11 NOTE — PROGRESS NOTES
Chief Complaint   Patient presents with    Follow Up Chronic Condition     1. \"Have you been to the ER, urgent care clinic since your last visit? Hospitalized since your last visit? \" No    2. \"Have you seen or consulted any other health care providers outside of the [address] [place] [address] since your last visit? \" No     3. For patients aged 39-70: Has the patient had a colonoscopy? No     If the patient is female:    4. For patients aged 41-77: Has the patient had a mammogram within the past 2 years? No    5. For patients aged 21-65: Has the patient had a pap smear?  Yes,  satisfied with blue hyperlink
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
## 2021-11-11 NOTE — PROGRESS NOTES
After obtaining consent, and per orders of Dr. Nilda De La Cruz, injection of flu given by Da Fernandes. Patient instructed to remain in clinic for 20 minutes afterwards, and to report any adverse reaction to me immediately.

## 2022-01-13 ENCOUNTER — VIRTUAL VISIT (OUTPATIENT)
Dept: FAMILY MEDICINE CLINIC | Age: 42
End: 2022-01-13
Payer: MEDICAID

## 2022-01-13 DIAGNOSIS — F31.9 BIPOLAR 1 DISORDER (HCC): ICD-10-CM

## 2022-01-13 DIAGNOSIS — J45.41 MODERATE PERSISTENT ASTHMATIC BRONCHITIS WITH ACUTE EXACERBATION: Primary | ICD-10-CM

## 2022-01-13 DIAGNOSIS — E66.01 MORBID OBESITY (HCC): ICD-10-CM

## 2022-01-13 PROCEDURE — 99213 OFFICE O/P EST LOW 20 MIN: CPT | Performed by: FAMILY MEDICINE

## 2022-01-13 NOTE — PROGRESS NOTES
.  Chief Complaint   Patient presents with    Follow Up Chronic Condition     concerns about vaccines     1. \"Have you been to the ER, urgent care clinic since your last visit? Hospitalized since your last visit? \" No    2. \"Have you seen or consulted any other health care providers outside of the [address] [place] [address] since your last visit? \" No     3. For patients aged 39-70: Has the patient had a colonoscopy / FIT/ Cologuard? NA based on age or sex     If the patient is female:    3. For patients aged 41-77: Has the patient had a mammogram within the past 2 years? NA based on age or sex    11. For patients aged 21-65: Has the patient had a pap smear?  Yes, HM satisfied with blue hyperlink

## 2022-01-13 NOTE — PROGRESS NOTES
Lesly Booth is a 46 y.o. female who was seen by synchronous (real-time) audio-video technology on 1/13/2022 for Follow Up Chronic Condition (concerns about vaccines)        Assessment & Plan:       ICD-10-CM ICD-9-CM    1. Moderate persistent asthmatic bronchitis with acute exacerbation  J45.41 493.92    2. Bipolar 1 disorder ([facility])  F31.9 296.7    3. Morbid obesity ([facility] [address]  E66.01 278.01      Subjective:   Lesly Booth is seen for follow-up care. Asthma: Patient has asthma: She is stable on inhaler medication. She has albuterol inhaler that she uses as needed. Denies cough, chest pain or shortness of breath. She will continue with this medication. Patient is concerned about vaccines. She wonders whether she should get PPSV23 vaccine. She is up-to-date on this. I did reassure her of this. She has received 2 COVID-19 vaccines. She does need to get her booster vaccine. Mood disorder: Patient has mood disorder. She is followed up by behavioral health specialist.  She is on Vraylar and Elavil. She is also on clonazepam.  She will continue with the current treatment plan. Morbid obesity: Patient has a BMI of 41.27. She will intensify lifestyle and dietary modification. Prior to Admission medications    Medication Sig Start Date End Date Taking? Authorizing Provider   baclofen (LIORESAL) 10 mg tablet Take 10 mg by mouth two (2) times a day. 8/4/21  Yes Provider, Historical   Vraylar 3 mg capsule Take 3 mg by mouth daily. 9/24/21  Yes Provider, Historical   fluorometholone (FML) 0.1 % ophthalmic suspension Administer 0.1 Drops to both eyes three (3) times daily. Yes Provider, Historical   Prenate Mini, ferr asp glycin, 18-1-350 mg cap Take 1 Capsule by mouth daily. 11/1/21  Yes Provider, Historical   diclofenac (VOLTAREN) 1 % gel Apply 1 g to affected area four (4) times daily.  11/11/21  Yes Lamar Rondon MD   albuterol (PROVENTIL HFA, VENTOLIN HFA, PROAIR HFA) 90 mcg/actuation inhaler inhale 2 puffs by mouth and INTO THE LUNGS every 6 hours if needed for WHEEZING AND SHORTNESS OF BREATH 3/30/21  Yes Estella Lu MD   fluticasone propionate (FLONASE) 50 mcg/actuation nasal spray 2 Sprays by Both Nostrils route daily as needed for Rhinitis or Allergies. 3/26/20  Yes Estella Lu MD   loratadine (Claritin) 5 mg/5 mL syrup Take 10 mL by mouth daily. 3/26/20  Yes Estella Lu MD   amitriptyline (ELAVIL) 10 mg tablet Take 1 Tab by mouth nightly. 8/20/19  Yes Araseli Andrade MD   ofloxacin (FLOXIN) 0.3 % otic solution ADMINISTER 5 DROPS INTO EACH EAR DAILY 2/20/19  Yes Provider, Historical   clonazePAM (KLONOPIN) 0.5 mg tablet  3/18/17  Yes Provider, Historical     ROS Review of all systems is negative except as noted above in the HPI. Objective:     Patient-Reported Vitals 1/13/2022   Patient-Reported Weight 248 lbs   Patient-Reported Height -   Patient-Reported Temperature 97.4   Patient-Reported LMP 12-   Constitutional: [x] Appears well-developed and well-nourished [x] No apparent distress     Mental status: [x] Alert and awake  [x] Oriented to person/place/time [x] Able to follow commands    HENT: [x] Normocephalic, atraumatic     Pulmonary/Chest: [x] Respiratory effort normal   [x] No visualized signs of difficulty breathing or respiratory distress           Neurological:        [x] No Facial Asymmetry (Cranial nerve 7 motor function) (limited exam due to video visit)                     Psychiatric:       [x] Normal Affect    We discussed the expected course, resolution and complications of the diagnosis(es) in detail. Medication risks, benefits, costs, interactions, and alternatives were discussed as indicated. I advised her to contact the office if her condition worsens, changes or fails to improve as anticipated. She expressed understanding with the diagnosis(es) and plan. Aditya Malik, was evaluated through a synchronous (real-time) audio-video encounter.  The patient (or guardian if applicable) is aware that this is a billable service. Verbal consent to proceed has been obtained within the past 12 months. The visit was conducted pursuant to the emergency declaration under the [address] [place] [address] authority and the [employer] General Act. Patient identification was verified, and a caregiver was present when appropriate. The patient was located in a state where the provider was credentialed to provide care.       Axel Solorzano MD

## 2022-01-14 ENCOUNTER — TELEPHONE (OUTPATIENT)
Dept: FAMILY MEDICINE CLINIC | Age: 42
End: 2022-01-14

## 2022-01-14 DIAGNOSIS — K21.9 GASTROESOPHAGEAL REFLUX DISEASE, UNSPECIFIED WHETHER ESOPHAGITIS PRESENT: Primary | ICD-10-CM

## 2022-01-14 RX ORDER — FAMOTIDINE 10 MG/1
TABLET ORAL
Qty: 60 TABLET | Refills: 0 | Status: SHIPPED | OUTPATIENT
Start: 2022-01-14

## 2022-01-14 NOTE — PROGRESS NOTES
Patient called today and Negra Lopez ()spoke to patient. Patient sent a my chart message to her PCP in reference of wanting something for acid reflux and had not heard anything as of today. Today patient was given options to use OTC medications from [address] ([address] Patient wanted a prescription for medication. Ordered Pepcid for patient to use as needed and it was advised to follow up with her PCP or go to ER if symptoms worsen.

## 2022-01-14 NOTE — TELEPHONE ENCOUNTER
Patient called upset that she did not get a call from Dr. Mike Carvajal or his nurse on 01/13/22 and that a nurse contacted her today01/14/22 from the office with instructions to get some over the counter acid reflux medication to try. I spoke to the patient and explained that we try and follow up with our patients with in a 24 to 48 hour time period even if that certain provider is not here. She asked if a prescription medication could be sent to her pharmacy instead of an over the counter. I spoke to Vidal Clarke NP in the office today. She will send in Pepcid and patient is to follow up with Dr. Mike Carvajal if that does not resolve her issue.     I called patient and she was very thankful

## 2022-01-25 ENCOUNTER — TELEPHONE (OUTPATIENT)
Dept: FAMILY MEDICINE CLINIC | Age: 42
End: 2022-01-25

## 2022-01-25 NOTE — TELEPHONE ENCOUNTER
Pt would like for Dr. Jania Ha to prescibe her doxycline for her skin. Pt stated the pharmacy is [facility] on [address]. Ms. Mica Jacobsen can be reached at [phone]. Please advise.  Thank you!!!

## 2022-01-26 RX ORDER — DOXYCYCLINE 100 MG/1
CAPSULE ORAL
Qty: 20 CAPSULE | Refills: 0 | Status: SHIPPED | OUTPATIENT
Start: 2022-01-26 | End: 2022-09-14

## 2022-02-01 ENCOUNTER — OFFICE VISIT (OUTPATIENT)
Dept: FAMILY MEDICINE CLINIC | Age: 42
End: 2022-02-01
Payer: MEDICAID

## 2022-02-01 VITALS
WEIGHT: 248 LBS | DIASTOLIC BLOOD PRESSURE: 75 MMHG | HEIGHT: 65 IN | HEART RATE: 77 BPM | BODY MASS INDEX: 41.32 KG/M2 | OXYGEN SATURATION: 98 % | RESPIRATION RATE: 22 BRPM | TEMPERATURE: 98.6 F | SYSTOLIC BLOOD PRESSURE: 107 MMHG

## 2022-02-01 DIAGNOSIS — R22.41 LUMP OF SKIN OF RIGHT LOWER EXTREMITY: Primary | ICD-10-CM

## 2022-02-01 DIAGNOSIS — G47.00 INSOMNIA, UNSPECIFIED TYPE: ICD-10-CM

## 2022-02-01 DIAGNOSIS — K21.9 GASTROESOPHAGEAL REFLUX DISEASE, UNSPECIFIED WHETHER ESOPHAGITIS PRESENT: ICD-10-CM

## 2022-02-01 DIAGNOSIS — E66.01 MORBID OBESITY (HCC): ICD-10-CM

## 2022-02-01 DIAGNOSIS — F31.9 BIPOLAR 1 DISORDER (HCC): ICD-10-CM

## 2022-02-01 DIAGNOSIS — L73.9 FOLLICULITIS: ICD-10-CM

## 2022-02-01 PROCEDURE — 99214 OFFICE O/P EST MOD 30 MIN: CPT | Performed by: FAMILY MEDICINE

## 2022-02-01 RX ORDER — MUPIROCIN 20 MG/G
OINTMENT TOPICAL DAILY
Qty: 22 G | Refills: 0 | Status: SHIPPED | OUTPATIENT
Start: 2022-02-01 | End: 2022-10-26
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
## 2022-02-01 NOTE — PROGRESS NOTES
Chief Complaint   Patient presents with    Cyst     want to see if it need to be cut     1. \"Have you been to the ER, urgent care clinic since your last visit? Hospitalized since your last visit? \" No    2. \"Have you seen or consulted any other health care providers outside of the [address] [place] [address] since your last visit? \" No     3. For patients aged 39-70: Has the patient had a colonoscopy / FIT/ Cologuard? NA - based on age      If the patient is female:    4. For patients aged 41-77: Has the patient had a mammogram within the past 2 years? NA - based on age or sex      11. For patients aged 21-65: Has the patient had a pap smear?  Yes - no Care Gap present

## 2022-02-08 ENCOUNTER — OFFICE VISIT (OUTPATIENT)
Dept: SURGERY | Age: 42
End: 2022-02-08
Payer: MEDICAID

## 2022-02-08 VITALS
WEIGHT: 248 LBS | TEMPERATURE: 97.2 F | HEART RATE: 73 BPM | OXYGEN SATURATION: 98 % | HEIGHT: 65 IN | DIASTOLIC BLOOD PRESSURE: 70 MMHG | BODY MASS INDEX: 41.32 KG/M2 | SYSTOLIC BLOOD PRESSURE: 116 MMHG | RESPIRATION RATE: 17 BRPM

## 2022-02-08 DIAGNOSIS — M79.89 MASS OF SOFT TISSUE OF THIGH: Primary | ICD-10-CM

## 2022-02-08 PROCEDURE — 99205 OFFICE O/P NEW HI 60 MIN: CPT | Performed by: SURGERY
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
## 2022-02-09 NOTE — PATIENT INSTRUCTIONS
Epidermoid Cyst: Care Instructions  Your Care Instructions  An epidermoid (say \"ra-goj-WUD-marino\") cyst is a lump just under the skin. These cysts can form when a hair follicle becomes blocked. They are common in acne and may occur on the face, neck, back, and genitals. However, they can form anywhere on the body. These cysts are not cancer and do not lead to cancer. They tend not to hurt, but they can sometimes become swollen and painful. They also may break open (rupture) and cause scarring. These cysts sometimes do not cause problems and may not need treatment. If you have a cyst that is swollen and hurts, your doctor may inject it with a medicine to help it heal. But it is more likely that a painful cyst will need to be removed. Your doctor will give you a shot of numbing medicine and cut into the cyst to drain it or remove it. This makes the symptoms go away. Follow-up care is a key part of your treatment and safety. Be sure to make and go to all appointments, and call your doctor if you are having problems. It's also a good idea to know your test results and keep a list of the medicines you take. How can you care for yourself at home? · Do not squeeze the cyst or poke it with a needle to open it. This can cause swelling, redness, and infection. · Always have a doctor look at any new lumps you get to make sure that they are not serious. When should you call for help? Watch closely for changes in your health, and be sure to contact your doctor if:    · You have a fever, redness, or swelling after you get a shot of medicine in the cyst.     · You see or feel a new lump on your skin. Where can you learn more? Go to http://www.gray.com/  Enter J154 in the search box to learn more about \"Epidermoid Cyst: Care Instructions. \"  Current as of: March 3, 2021               Content Version: 13.0  © 8493-1404 [employer], [employer].    Care instructions adapted under license by [employer] (which disclaims liability or warranty for this information). If you have questions about a medical condition or this instruction, always ask your healthcare professional. [address] any warranty or liability for your use of this information.

## 2022-03-18 PROBLEM — F99 MENTAL DISORDER: Status: ACTIVE | Noted: 2021-11-11

## 2022-03-18 PROBLEM — J30.81 ALLERGIC RHINITIS DUE TO ANIMAL HAIR AND DANDER: Status: ACTIVE | Noted: 2017-04-20

## 2022-03-18 PROBLEM — J45.909 ASTHMA: Status: ACTIVE | Noted: 2021-11-11

## 2022-03-19 PROBLEM — E16.2 HYPOGLYCEMIA: Status: ACTIVE | Noted: 2021-11-11

## 2022-03-19 PROBLEM — J30.89 ENVIRONMENTAL AND SEASONAL ALLERGIES: Status: ACTIVE | Noted: 2017-04-20

## 2022-03-19 PROBLEM — E28.2 PCOS (POLYCYSTIC OVARIAN SYNDROME): Status: ACTIVE | Noted: 2018-02-07

## 2022-03-20 PROBLEM — E66.01 OBESITY, MORBID (HCC): Status: ACTIVE | Noted: 2018-06-25

## 2022-09-13 ENCOUNTER — CLINICAL SUPPORT (OUTPATIENT)
Dept: FAMILY MEDICINE CLINIC | Age: 42
End: 2022-09-13
Payer: MEDICAID

## 2022-09-13 DIAGNOSIS — Z11.1 ENCOUNTER FOR PPD TEST: Primary | ICD-10-CM

## 2022-09-13 LAB
MM INDURATION POC: 0 MM (ref 0–5)
PPD POC: NEGATIVE NEGATIVE

## 2022-09-13 PROCEDURE — 86580 TB INTRADERMAL TEST: CPT | Performed by: FAMILY MEDICINE

## 2022-09-13 NOTE — PROGRESS NOTES
Tuberculin skin test applied to right ventral forearm.  Explained how to read the test, measuring induration not just erythema; she will come into office if test appears posi

## 2022-09-14 ENCOUNTER — VIRTUAL VISIT (OUTPATIENT)
Dept: FAMILY MEDICINE CLINIC | Age: 42
End: 2022-09-14
Payer: MEDICAID

## 2022-09-14 DIAGNOSIS — Z11.59 NEED FOR HEPATITIS C SCREENING TEST: ICD-10-CM

## 2022-09-14 DIAGNOSIS — Z13.220 SCREENING, LIPID: ICD-10-CM

## 2022-09-14 DIAGNOSIS — Z01.84 IMMUNITY STATUS TESTING: ICD-10-CM

## 2022-09-14 DIAGNOSIS — F41.9 ANXIETY: Primary | ICD-10-CM

## 2022-09-14 DIAGNOSIS — Z00.00 GENERAL MEDICAL EXAM: ICD-10-CM

## 2022-09-14 DIAGNOSIS — F31.9 BIPOLAR 1 DISORDER (HCC): ICD-10-CM

## 2022-09-14 PROCEDURE — 99213 OFFICE O/P EST LOW 20 MIN: CPT | Performed by: FAMILY MEDICINE

## 2022-09-14 RX ORDER — HYDROXYZINE PAMOATE 25 MG/1
25 CAPSULE ORAL
Qty: 60 CAPSULE | Refills: 1 | Status: SHIPPED | OUTPATIENT
Start: 2022-09-14 | End: 2022-09-19 | Stop reason: SDUPTHER

## 2022-09-14 RX ORDER — CLONAZEPAM 0.5 MG/1
TABLET ORAL
Status: CANCELLED | OUTPATIENT
Start: 2022-09-14

## 2022-09-14 NOTE — PROGRESS NOTES
1. \"Have you been to the ER, urgent care clinic since your last visit? Hospitalized since your last visit? \" No    2. \"Have you seen or consulted any other health care providers outside of the [address] [place] [address] since your last visit? \" No     3. For patients aged 39-70: Has the patient had a colonoscopy / FIT/ Cologuard? N/A      If the patient is female:    4. For patients aged 41-77: Has the patient had a mammogram within t  he past 2 years? No      5. For patients aged 21-65: Has the patient had a pap smear? Yes - Care Gap present.  Most recent result on file

## 2022-09-14 NOTE — PROGRESS NOTES
Susanna Frederick is a 38 y.o. female who was seen by synchronous (real-time) audio-video technology on 9/14/2022 for Anxiety (Refills requested for Klonipin)        Assessment & Plan:       ICD-10-CM ICD-9-CM    1. Anxiety  F41.9 300.00 hydrOXYzine pamoate (VISTARIL) 25 mg capsule      2. Immunity status testing  Z01.84 V72.61 HEP B SURFACE AB      3. Need for hepatitis C screening test  Z11.59 V73.89 HEPATITIS C AB      4. Screening, lipid  Z13.220 V77.91 LIPID PANEL      5. Bipolar 1 disorder (HCC)  F31.9 296.7       6. General medical exam  K48.36 F53.8 METABOLIC PANEL, COMPREHENSIVE      CBC WITH AUTOMATED DIFF        Subjective:   Susanna Frederick is seen for follow-up care. Mood disorder: Patient has anxiety. She is being seen at [facility]. She is on various medications including Vraylar and Klonopin. Her provider has discussed the need to change Klonopin and put her on a different medication. She was discussing a refill of her Klonopin. We would not give Klonopin given the controlled nature of the medication and she would have to get this from her provider. She is willing to try different medication. Her provider had suggested Vistaril. She would like a prescription of that to try for anxiety. I will send in the prescription. Immunity testing: Patient is in nursing school. We will check for hepatitis B surface antibodies. We will also request hepatitis C antibody testing. Screen lipid: Patient will have labs checked including lipid panel to screen for lipid abnormality. Prior to Admission medications    Medication Sig Start Date End Date Taking? Authorizing Provider   hydrOXYzine pamoate (VISTARIL) 25 mg capsule Take 1 Capsule by mouth three (3) times daily as needed for Anxiety. 9/14/22  Yes Estella Lu MD   ferrous gluconate 324 mg (38 mg iron) tablet Take 1 Tablet by mouth two (2) times daily (with meals).  7/10/22  Yes Lala Gardiner MD   mupirocin (BACTROBAN) 2 % ointment Apply  to affected area daily. 2/1/22  Yes Oscar Mahoney MD   famotidine (PEPCID) 10 mg tablet Take 1 tablet by mouth twice daily as needed 1/14/22  Yes Zunilda Dewey NP   Vraylar 3 mg capsule Take 3 mg by mouth daily. 9/24/21  Yes Provider, Historical   fluorometholone (FML) 0.1 % ophthalmic suspension Administer 0.1 Drops to both eyes three (3) times daily. Yes Provider, Historical   Prenate Mini, ferr asp glycin, 18-1-350 mg cap Take 1 Capsule by mouth daily. 11/1/21  Yes Provider, Historical   diclofenac (VOLTAREN) 1 % gel Apply 1 g to affected area four (4) times daily. 11/11/21  Yes Oscar Mahoney MD   albuterol (PROVENTIL HFA, VENTOLIN HFA, PROAIR HFA) 90 mcg/actuation inhaler inhale 2 puffs by mouth and INTO THE LUNGS every 6 hours if needed for WHEEZING AND SHORTNESS OF BREATH 3/30/21  Yes Estella Lu MD   fluticasone propionate (FLONASE) 50 mcg/actuation nasal spray 2 Sprays by Both Nostrils route daily as needed for Rhinitis or Allergies. 3/26/20  Yes Estella Lu MD   loratadine (Claritin) 5 mg/5 mL syrup Take 10 mL by mouth daily. 3/26/20  Yes Estella Lu MD   amitriptyline (ELAVIL) 10 mg tablet Take 1 Tab by mouth nightly. 8/20/19  Yes Estella Lu MD   ofloxacin (FLOXIN) 0.3 % otic solution ADMINISTER 5 DROPS INTO EACH EAR DAILY 2/20/19  Yes Provider, Historical   clonazePAM (KLONOPIN) 0.5 mg tablet  3/18/17  Yes Provider, Historical   doxycycline (VIBRAMYCIN) 100 mg capsule take 1 capsule by mouth twice a day for 10 days  Patient not taking: Reported on 9/14/2022 1/26/22   Estella Lu MD   baclofen (LIORESAL) 10 mg tablet Take 10 mg by mouth two (2) times a day. 8/4/21   Provider, Historical     ROS Review of all systems is negative except as noted above in the HPI.       Objective:     Patient-Reported Vitals 9/14/2022   Patient-Reported Weight 178   Patient-Reported Height -   Patient-Reported Temperature -   Patient-Reported LMP 8- Constitutional: [x] Appears well-developed and well-nourished [x] No apparent distress      Mental status: [x] Alert and awake  [x] Oriented to person/place/time [x] Able to follow commands     HENT: [x] Normocephalic, atraumatic      Pulmonary/Chest: [x] Respiratory effort normal   [x] No visualized signs of difficulty breathing or respiratory distress            Neurological:        [x] No Facial Asymmetry (Cranial nerve 7 motor function) (limited exam due to video visit)                      Psychiatric:       [x] Normal Affect     We discussed the expected course, resolution and complications of the diagnosis(es) in detail. Medication risks, benefits, costs, interactions, and alternatives were discussed as indicated. I advised her to contact the office if her condition worsens, changes or fails to improve as anticipated. She expressed understanding with the diagnosis(es) and plan. Waylon Aiken, was evaluated through a synchronous (real-time) audio-video encounter. The patient (or guardian if applicable) is aware that this is a billable service, which includes applicable co-pays. This Virtual Visit was conducted with patient's (and/or legal guardian's) consent. The visit was conducted pursuant to the emergency declaration under the [address] [place] [address] authority and the [employer] and [employer] General Act. Patient identification was verified, and a caregiver was present when appropriate. The patient was located at: Home: [address] [place] [address]  The provider was located at:  Facility (Appt Department): [address] MD

## 2022-09-15 ENCOUNTER — TELEPHONE (OUTPATIENT)
Dept: FAMILY MEDICINE CLINIC | Age: 42
End: 2022-09-15

## 2022-09-15 ENCOUNTER — CLINICAL SUPPORT (OUTPATIENT)
Dept: FAMILY MEDICINE CLINIC | Age: 42
End: 2022-09-15

## 2022-09-15 DIAGNOSIS — Z11.1 ENCOUNTER FOR PPD SKIN TEST READING: Primary | ICD-10-CM

## 2022-09-15 NOTE — TELEPHONE ENCOUNTER
Ms Susana Rain is asking if Dr Franca Hernandez can resend the medication (hydrOXYzine pamoate)to the current selected pharmacy on her file because [facility] does not take her ins.  Please be advised

## 2022-09-19 DIAGNOSIS — F41.9 ANXIETY: ICD-10-CM

## 2022-09-19 RX ORDER — HYDROXYZINE PAMOATE 25 MG/1
25 CAPSULE ORAL
Qty: 60 CAPSULE | Refills: 1 | Status: SHIPPED | OUTPATIENT
Start: 2022-09-19 | End: 2022-09-21 | Stop reason: SDUPTHER

## 2022-09-21 DIAGNOSIS — F41.9 ANXIETY: ICD-10-CM

## 2022-09-21 RX ORDER — HYDROXYZINE PAMOATE 25 MG/1
25 CAPSULE ORAL
Qty: 60 CAPSULE | Refills: 1 | Status: SHIPPED | OUTPATIENT
Start: 2022-09-21

## 2022-10-26 ENCOUNTER — VIRTUAL VISIT (OUTPATIENT)
Dept: FAMILY MEDICINE CLINIC | Age: 42
End: 2022-10-26
Payer: MEDICAID

## 2022-10-26 DIAGNOSIS — K21.9 GASTROESOPHAGEAL REFLUX DISEASE, UNSPECIFIED WHETHER ESOPHAGITIS PRESENT: ICD-10-CM

## 2022-10-26 DIAGNOSIS — F41.9 ANXIETY: Primary | ICD-10-CM

## 2022-10-26 PROCEDURE — 99213 OFFICE O/P EST LOW 20 MIN: CPT | Performed by: FAMILY MEDICINE

## 2022-10-26 NOTE — PROGRESS NOTES
Stacie Lin is a 38 y.o. female who was seen by synchronous (real-time) audio-video technology on 10/26/2022 for Referral Request        Assessment & Plan:       ICD-10-CM ICD-9-CM    1. Anxiety  F41.9 300.00 REFERRAL TO BEHAVIORAL HEALTH      2. Gastroesophageal reflux disease, unspecified whether esophagitis present  K21.9 530.81         Subjective:   Stacie Lin is seen for follow-up care. Mood disorder: Patient has a history of anxiety. She has been followed up by behavioral health specialist in the past for anxiety and bipolar disorder. Previously she was on clonazepam.  She is no longer on this medication. Currently she is on hydroxyzine for anxiety. States that she is stable on the medication. Patient requests referral to Dr. Manjit Atkins fax #[phone]. She wants an evaluation done by him for a concealed weapons permit. Referral will be placed. GERD: Patient has a history of gastroesophageal reflux disease. She has used Pepcid for this. Currently stable. Denies dark stools or hematemesis. Prior to Admission medications    Medication Sig Start Date End Date Taking? Authorizing Provider   hydrOXYzine pamoate (VISTARIL) 25 mg capsule Take 1 Capsule by mouth three (3) times daily as needed for Anxiety. 9/21/22  Yes Estella Lu MD   ferrous gluconate 324 mg (38 mg iron) tablet Take 1 Tablet by mouth two (2) times daily (with meals). 7/10/22  Yes Kevin Whipple MD   famotidine (PEPCID) 10 mg tablet Take 1 tablet by mouth twice daily as needed 1/14/22  Yes Zunilda Dewey NP   baclofen (LIORESAL) 10 mg tablet Take 10 mg by mouth two (2) times a day. 8/4/21  Yes Provider, Historical   fluorometholone (FML) 0.1 % ophthalmic suspension Administer 0.1 Drops to both eyes three (3) times daily. Yes Provider, Historical   Prenate Mini, ferr asp glycin, 18-1-350 mg cap Take 1 Capsule by mouth daily.  11/1/21  Yes Provider, Historical   diclofenac (VOLTAREN) 1 % gel Apply 1 g to affected area four (4) times daily. 11/11/21  Yes Luh Howard MD   albuterol (PROVENTIL HFA, VENTOLIN HFA, PROAIR HFA) 90 mcg/actuation inhaler inhale 2 puffs by mouth and INTO THE LUNGS every 6 hours if needed for WHEEZING AND SHORTNESS OF BREATH 3/30/21  Yes Estella Lu MD   loratadine (Claritin) 5 mg/5 mL syrup Take 10 mL by mouth daily. 3/26/20  Yes Estella Lu MD   ofloxacin (FLOXIN) 0.3 % otic solution ADMINISTER 5 DROPS INTO EACH EAR DAILY 2/20/19  Yes Provider, Historical   mupirocin (BACTROBAN) 2 % ointment Apply  to affected area daily. 2/1/22 10/26/22  Estella Lu MD   Vraylar 3 mg capsule Take 3 mg by mouth daily. 9/24/21 10/26/22  Provider, Historical   fluticasone propionate (FLONASE) 50 mcg/actuation nasal spray 2 Sprays by Both Nostrils route daily as needed for Rhinitis or Allergies. 3/26/20 10/26/22  Estella Lu MD   amitriptyline (ELAVIL) 10 mg tablet Take 1 Tab by mouth nightly. 8/20/19 10/26/22  Estella Lu MD   clonazePAM (KLONOPIN) 0.5 mg tablet  3/18/17 10/26/22  Provider, Historical     ROS Review of all systems is negative except as noted above in the HPI. Objective:     Patient-Reported Vitals 9/14/2022   Patient-Reported Weight 178   Patient-Reported Height -   Patient-Reported Temperature -   Patient-Reported LMP 8-   Constitutional: [x] Appears well-developed and well-nourished [x] No apparent distress      Mental status: [x] Alert and awake  [x] Oriented to person/place/time [x] Able to follow commands       HENT: [x] Normocephalic, atraumatic     Pulmonary/Chest: [x] Respiratory effort normal   [x] No visualized signs of difficulty breathing or respiratory distress            Neurological:        [x] No Facial Asymmetry (Cranial nerve 7 motor function) (limited exam due to video visit)                     Psychiatric:       [x] Normal Affect     We discussed the expected course, resolution and complications of the diagnosis(es) in detail. Medication risks, benefits, costs, interactions, and alternatives were discussed as indicated. I advised her to contact the office if her condition worsens, changes or fails to improve as anticipated. She expressed understanding with the diagnosis(es) and plan. Adela Kidney, was evaluated through a synchronous (real-time) audio-video encounter. The patient (or guardian if applicable) is aware that this is a billable service, which includes applicable co-pays. This Virtual Visit was conducted with patient's (and/or legal guardian's) consent. The visit was conducted pursuant to the emergency declaration under the [address] [place] [address], [address] [place] [address] authority and the [employer] General Act. Patient identification was verified, and a caregiver was present when appropriate. The patient was located at: Home: [address] [place] [address]  The provider was located at:  Facility (Appt Department): [address] MD

## 2022-11-02 ENCOUNTER — TELEPHONE (OUTPATIENT)
Dept: FAMILY MEDICINE CLINIC | Age: 42
End: 2022-11-02

## 2022-11-02 NOTE — TELEPHONE ENCOUNTER
Patient called and advised the medication she is requesting is Phentermine HCI    Wants to know if provider will prescribe this.

## 2022-12-04 ENCOUNTER — TELEPHONE (OUTPATIENT)
Dept: FAMILY MEDICINE CLINIC | Age: 42
End: 2022-12-04

## 2022-12-04 DIAGNOSIS — T78.40XA ALLERGIC REACTION, INITIAL ENCOUNTER: Primary | ICD-10-CM

## 2022-12-04 RX ORDER — DIPHENHYDRAMINE HCL 25 MG
25 TABLET ORAL
Qty: 60 TABLET | Refills: 0 | Status: SHIPPED | OUTPATIENT
Start: 2022-12-04

## 2022-12-04 NOTE — TELEPHONE ENCOUNTER
Returned call and verified pt name and . Patient states that she completed her dinner, and began to get hives. Patient states the food she ate did have shrimp but she was unaware. Patient states she has an allergy to shrimp, and has started to get hives on her arms. Patient states she did not have any Benadryl at her home, and was in the line at the pharmacy, but they closed at 6 PM.  Patient is asking if she could get a prescription for Benadryl sent to the pharmacy, because her insurance will pay for it. Patient's medication reconciliation was done and verified that she did not have hydroxyzine at home, that would interfere with Benadryl if taken together. Patient also verified that she does have an EpiPen at home but is not in her home at the moment. Patient denies shortness of breath, chest pain, or swelling of the lips or tongue. Patient states that the hives have spread to her face and the rest of her body while she was in the line. Patient was sent over Benadryl and informed of the instructions and to report to the emergency room if her symptoms progressed.

## 2023-02-08 ENCOUNTER — VIRTUAL VISIT (OUTPATIENT)
Dept: FAMILY MEDICINE CLINIC | Age: 43
End: 2023-02-08
Payer: MEDICAID

## 2023-02-08 DIAGNOSIS — E66.9 OBESITY (BMI 30-39.9): Primary | ICD-10-CM

## 2023-02-08 DIAGNOSIS — K21.9 GASTROESOPHAGEAL REFLUX DISEASE, UNSPECIFIED WHETHER ESOPHAGITIS PRESENT: ICD-10-CM

## 2023-02-08 DIAGNOSIS — F41.9 ANXIETY: ICD-10-CM

## 2023-02-08 PROCEDURE — 99213 OFFICE O/P EST LOW 20 MIN: CPT | Performed by: FAMILY MEDICINE

## 2023-02-08 RX ORDER — PHENTERMINE HYDROCHLORIDE 37.5 MG/1
37.5 TABLET ORAL
Qty: 30 TABLET | Refills: 0 | Status: SHIPPED | OUTPATIENT
Start: 2023-02-08

## 2023-02-08 NOTE — PROGRESS NOTES
1. \"Have you been to the ER, urgent care clinic since your last visit? Hospitalized since your last visit? \" No    2. \"Have you seen or consulted any other health care providers outside of the [address] [place] [address] since your last visit? \" No     3. For patients aged 39-70: Has the patient had a colonoscopy / FIT/ Cologuard? NA - based on age      If the patient is female:    4. For patients aged 41-77: Has the patient had a mammogram within the past 2 years? No      5. For patients aged 21-65: Has the patient had a pap smear? Yes - Care Gap present.  Rooming MA/LPN to request most recent results

## 2023-02-08 NOTE — PROGRESS NOTES
Gino Ko is a 38 y.o. female who was seen by synchronous (real-time) audio-video technology on 2/8/2023 for Weight Management and GERD        Assessment & Plan:       ICD-10-CM ICD-9-CM    1. Obesity (BMI 30-39. 9)  E66.9 278.00 phentermine (ADIPEX-P) 37.5 mg tablet      2. Gastroesophageal reflux disease, unspecified whether esophagitis present  K21.9 530.81       3. Anxiety  F41.9 300.00         Subjective:   Gino Ko is seen for follow-up care. GERD: Patient has gastroesophageal reflux disease. Patient gets heartburn on and off. Patient is on famotidine. This helps with the symptoms. She gets a lot of burping and epigastric discomfort. States that the famotidine has been doing well for her and she would prefer to continue with this medication. She denies dark stools or hematemesis. Obesity: Patient has a BMI of 32.60. She is doing lifestyle and dietary modification. She has used phentermine in the past with good result. Would like a refill of medication. Prescription is sent in. I will have her come in the clinic in 4 weeks for weight check. Mood disorder: Patient has mood disorder. She has anxiety. She is on hydroxyzine as needed for the anxiety. She will continue current treatment plan. Prior to Admission medications    Medication Sig Start Date End Date Taking? Authorizing Provider   phentermine (ADIPEX-P) 37.5 mg tablet Take 1 Tablet by mouth every morning. Max Daily Amount: 37.5 mg. 2/8/23  Yes Favian Fang MD   diphenhydrAMINE (BENADRYL) 25 mg tablet Take 1 Tablet by mouth every six (6) hours as needed for Allergies. Indications: an allergic reaction 12/4/22  Yes Alec Leone NP   hydrOXYzine pamoate (VISTARIL) 25 mg capsule Take 1 Capsule by mouth three (3) times daily as needed for Anxiety.  9/21/22  Yes Favian Fang MD   famotidine (PEPCID) 10 mg tablet Take 1 tablet by mouth twice daily as needed 1/14/22  Yes Isabelle Dewey NP   baclofen (LIORESAL) 10 mg tablet Take 10 mg by mouth two (2) times a day. 8/4/21  Yes Provider, Historical   Prenate Mini, ferr asp glycin, 18-1-350 mg cap Take 1 Capsule by mouth daily. 11/1/21  Yes Provider, Historical   diclofenac (VOLTAREN) 1 % gel Apply 1 g to affected area four (4) times daily. 11/11/21  Yes Lucrecia Anderson MD   albuterol (PROVENTIL HFA, VENTOLIN HFA, PROAIR HFA) 90 mcg/actuation inhaler inhale 2 puffs by mouth and INTO THE LUNGS every 6 hours if needed for WHEEZING AND SHORTNESS OF BREATH 3/30/21  Yes Estella Lu MD   loratadine (Claritin) 5 mg/5 mL syrup Take 10 mL by mouth daily. 3/26/20  Yes Estella Lu MD   ofloxacin (FLOXIN) 0.3 % otic solution  2/20/19  Yes Provider, Historical   ferrous gluconate 324 mg (38 mg iron) tablet Take 1 Tablet by mouth two (2) times daily (with meals). Patient not taking: No sig reported 7/10/22   Amol Hutson MD   fluorometholone (FML) 0.1 % ophthalmic suspension Administer 0.1 Drops to both eyes three (3) times daily. Patient not taking: No sig reported    Provider, Historical     ROS Review of all systems is negative except as noted above in the HPI.     Objective:     Patient-Reported Vitals 2/8/2023   Patient-Reported Weight 230   Patient-Reported Height -   Patient-Reported Temperature -   Patient-Reported LMP 2-6-2023   Constitutional: [x] Appears well-developed and well-nourished [x] No apparent distress      Mental status: [x] Alert and awake  [x] Oriented to person/place/time [x] Able to follow commands      HENT: [x] Normocephalic, atraumatic      Pulmonary/Chest: [x] Respiratory effort normal   [x] No visualized signs of difficulty breathing or respiratory distress            Neurological:        [x] No Facial Asymmetry (Cranial nerve 7 motor function) (limited exam due to video visit)                     Psychiatric:       [x] Normal Affect     We discussed the expected course, resolution and complications of the diagnosis(es) in detail. Medication risks, benefits, costs, interactions, and alternatives were discussed as indicated. I advised her to contact the office if her condition worsens, changes or fails to improve as anticipated. She expressed understanding with the diagnosis(es) and plan. Thelma Doe, was evaluated through a synchronous (real-time) audio-video encounter. The patient (or guardian if applicable) is aware that this is a billable service, which includes applicable co-pays. This Virtual Visit was conducted with patient's (and/or legal guardian's) consent. The visit was conducted pursuant to the emergency declaration under the [address] [place] [address], [address] [place] [address] authority and the [employer] Act. Patient identification was verified, and a caregiver was present when appropriate. The patient was located at: Home: [address] [place] [address]  The provider was located at:  Facility (Appt Department): [address] [place], [place]

## 2023-02-20 ENCOUNTER — TELEPHONE (OUTPATIENT)
Facility: CLINIC | Age: 43
End: 2023-02-20

## 2023-02-21 ENCOUNTER — TELEPHONE (OUTPATIENT)
Facility: CLINIC | Age: 43
End: 2023-02-21

## 2023-03-07 NOTE — TELEPHONE ENCOUNTER
Ib profin 800mg    for cramps     Banthen  25mg  for allergies       To [facility] on [address] in [facility]

## 2023-03-08 RX ORDER — BACLOFEN 10 MG/1
10 TABLET ORAL 2 TIMES DAILY
Qty: 60 TABLET | Refills: 0 | Status: SHIPPED | OUTPATIENT
Start: 2023-03-08

## 2023-03-08 RX ORDER — FAMOTIDINE 10 MG
TABLET ORAL
Qty: 60 TABLET | Refills: 0 | Status: SHIPPED | OUTPATIENT
Start: 2023-03-08

## 2023-03-08 RX ORDER — HYDROXYZINE PAMOATE 25 MG/1
25 CAPSULE ORAL 3 TIMES DAILY PRN
Qty: 90 CAPSULE | Refills: 0 | Status: SHIPPED | OUTPATIENT
Start: 2023-03-08

## 2023-03-08 RX ORDER — LORATADINE ORAL 5 MG/5ML
10 SOLUTION ORAL DAILY
Qty: 180 ML | Refills: 0 | Status: SHIPPED | OUTPATIENT
Start: 2023-03-08

## 2023-03-08 RX ORDER — IBUPROFEN 800 MG/1
800 TABLET ORAL
Qty: 90 TABLET | Refills: 0 | Status: SHIPPED | OUTPATIENT
Start: 2023-03-08

## 2023-03-08 NOTE — TELEPHONE ENCOUNTER
Patient called in requesting refill for Pepcid and wanted to know why the Ibuprofen was not sent in yesterday as requested. Patient wanted to remain on hold until provider was clear from another patient to have him send in the medications.   Patient states she needs the Ibuprofen for severe cramps

## 2023-03-14 ENCOUNTER — TELEPHONE (OUTPATIENT)
Age: 43
End: 2023-03-14

## 2023-03-14 NOTE — TELEPHONE ENCOUNTER
Patient calling requesting to be speak with the practice manager. I asked the patient what her name was and if there was anything I could help her with. She advised me that she could not see her upcoming appointment at the practice in her [address] I asked the patient if she had updated her MyChart because we started using a new system about 4 weeks ago so a new MyChart was in place. She told me I was wrong and there was no new MyChart. I offered to give her the MyChart help desk number which she refused. She told me I needed to fix the system so she could make sure all her records were going to be there when she comes to her appointment. I advised her that all her [employer] records were in the system but I did not have the ability to fix MyChart and offered the MyChart number again. She then asked how is she supposed to know when her appointment is. I advised her of her appointment date and time. She then asked how is she supposed to remember if she doesn't see it in MyChart and get reminder calls and emails. I advised her that we can not sent the automated calls and emails until the establish care appointment where there is a consent form that is signed so we have the ability to send emails, texts, and calls. She stated that she has been a [employer] patient for 8 years and doesn't understand why she needs to sign a consent. She then stated that she is giving verbal consent over the phone so that should be good enough. I then advised her that I could not accept that and needed a physical signature. She then stated that she needed to change her appointment to the same day 3/22 but later in the afternoon. I advised her that that day was fully booked but I could reschedule the visit for a different day. She then stated that she needs to be seen this week or next week because she is an asthmatic and needs to be monitored.  I told her that I understood but unfortunately do not have anything with any of the providers accepting new patients sooner than the appointment she was already scheduled for on 3/22. She then stated she did not understand why she just couldn't come in an hour or two later and how it didn't make any difference. I advised her the schedules were fully booked so I can't just move the appointment back. She then continued yelling stating how ridiculous this was and doesn't understand why we had been speaking for 12 minutes when all she wanted was to speak with the practice manager. I advised her the practice manager would offer her the same appointments that I did. That is when she told me I was wrong again and the practice manger would be able to get her in this week or the following. She continued to yell and then stated I needed to stop acting like a f**king gustavo*deonte. I then disconnected the line and spoke with the practice manager.
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
